# Patient Record
Sex: MALE | Race: WHITE | NOT HISPANIC OR LATINO | Employment: FULL TIME | URBAN - METROPOLITAN AREA
[De-identification: names, ages, dates, MRNs, and addresses within clinical notes are randomized per-mention and may not be internally consistent; named-entity substitution may affect disease eponyms.]

---

## 2019-09-20 RX ORDER — SCOLOPAMINE TRANSDERMAL SYSTEM 1 MG/1
1 PATCH, EXTENDED RELEASE TRANSDERMAL
COMMUNITY
Start: 2019-04-24 | End: 2020-04-23

## 2019-09-20 RX ORDER — LOSARTAN POTASSIUM 100 MG/1
TABLET ORAL
COMMUNITY
Start: 2019-09-03 | End: 2020-03-02 | Stop reason: SDUPTHER

## 2019-09-20 RX ORDER — AMLODIPINE BESYLATE 10 MG/1
TABLET ORAL
COMMUNITY
Start: 2019-09-03 | End: 2020-03-02 | Stop reason: SDUPTHER

## 2019-09-20 RX ORDER — SUCRALFATE 1 G/1
1 TABLET ORAL 4 TIMES DAILY PRN
COMMUNITY
Start: 2016-12-27

## 2019-09-20 RX ORDER — OMEPRAZOLE 20 MG/1
20 CAPSULE, DELAYED RELEASE ORAL
COMMUNITY
Start: 2019-04-24

## 2019-09-24 ENCOUNTER — OFFICE VISIT (OUTPATIENT)
Dept: FAMILY MEDICINE CLINIC | Facility: CLINIC | Age: 54
End: 2019-09-24
Payer: COMMERCIAL

## 2019-09-24 VITALS
HEIGHT: 69 IN | TEMPERATURE: 98.8 F | OXYGEN SATURATION: 97 % | WEIGHT: 216.8 LBS | HEART RATE: 80 BPM | DIASTOLIC BLOOD PRESSURE: 80 MMHG | BODY MASS INDEX: 32.11 KG/M2 | SYSTOLIC BLOOD PRESSURE: 118 MMHG | RESPIRATION RATE: 16 BRPM

## 2019-09-24 DIAGNOSIS — I10 ESSENTIAL HYPERTENSION: ICD-10-CM

## 2019-09-24 DIAGNOSIS — Z00.00 HEALTHCARE MAINTENANCE: Primary | ICD-10-CM

## 2019-09-24 DIAGNOSIS — E55.9 VITAMIN D INSUFFICIENCY: ICD-10-CM

## 2019-09-24 DIAGNOSIS — K22.70 BARRETT'S ESOPHAGUS WITHOUT DYSPLASIA: ICD-10-CM

## 2019-09-24 DIAGNOSIS — Z12.11 ENCOUNTER FOR SCREENING FOR MALIGNANT NEOPLASM OF COLON: ICD-10-CM

## 2019-09-24 DIAGNOSIS — J06.9 VIRAL URI WITH COUGH: ICD-10-CM

## 2019-09-24 DIAGNOSIS — K21.9 GASTROESOPHAGEAL REFLUX DISEASE, ESOPHAGITIS PRESENCE NOT SPECIFIED: ICD-10-CM

## 2019-09-24 PROBLEM — M47.814 THORACIC SPONDYLOSIS WITHOUT MYELOPATHY: Status: ACTIVE | Noted: 2019-09-24

## 2019-09-24 PROBLEM — M54.14 THORACIC NEURITIS: Status: ACTIVE | Noted: 2019-09-24

## 2019-09-24 PROBLEM — G47.30 SLEEP APNEA: Status: ACTIVE | Noted: 2019-04-24

## 2019-09-24 PROBLEM — Z91.030 BEE STING ALLERGY: Status: ACTIVE | Noted: 2019-04-24

## 2019-09-24 PROBLEM — K42.9 UMBILICAL HERNIA: Status: ACTIVE | Noted: 2018-05-14

## 2019-09-24 PROBLEM — M54.17 LUMBOSACRAL RADICULITIS: Status: ACTIVE | Noted: 2019-09-24

## 2019-09-24 PROBLEM — Z01.818 PREOP EXAMINATION: Status: ACTIVE | Noted: 2018-05-14

## 2019-09-24 PROCEDURE — 99386 PREV VISIT NEW AGE 40-64: CPT | Performed by: FAMILY MEDICINE

## 2019-09-24 RX ORDER — EPINEPHRINE 0.3 MG/.3ML
INJECTION SUBCUTANEOUS
COMMUNITY
End: 2021-05-07 | Stop reason: SDUPTHER

## 2019-09-24 RX ORDER — ERGOCALCIFEROL 1.25 MG/1
50000 CAPSULE ORAL WEEKLY
Qty: 4 CAPSULE | Refills: 3 | Status: SHIPPED | OUTPATIENT
Start: 2019-09-24 | End: 2019-12-20 | Stop reason: SDUPTHER

## 2019-09-24 NOTE — ASSESSMENT & PLAN NOTE
Continue on the Prilosec  I am going to refer him to see she I for follow-up and possible endoscopy

## 2019-09-24 NOTE — PROGRESS NOTES
Assessment/Plan:  Encounter for screening for malignant neoplasm of colon  It was discussed about immunization, diet, exercise and safety measures    Essential hypertension  Well controlled  Continue same  Will continue to monitor  Bashir's esophagus  Continue on the Prilosec  I am going to refer him to see she I for follow-up and possible endoscopy  Gastroesophageal reflux disease  Stable  Continue same  Viral URI with cough  It was discussed about encourage oral hydration and use humidifier at home  Diagnoses and all orders for this visit:    Healthcare maintenance    Viral URI with cough    Essential hypertension    Gastroesophageal reflux disease, esophagitis presence not specified    Encounter for screening for malignant neoplasm of colon  -     Occult Blood, Fecal Immunochemical; Future  -     Ambulatory referral to Gastroenterology; Future    Bashir's esophagus without dysplasia  -     Ambulatory referral to Gastroenterology; Future    Vitamin D insufficiency  -     ergocalciferol (VITAMIN D2) 50,000 units; Take 1 capsule (50,000 Units total) by mouth once a week    BMI 32 0-32 9,adult    Other orders  -     amLODIPine (NORVASC) 10 mg tablet; 1 tab  Daily in the AM  -     losartan (COZAAR) 100 MG tablet; 1 tab  Daily in the AM  -     omeprazole (PRILOSEC) 20 mg delayed release capsule; Take 20 mg by mouth  -     scopolamine (TRANSDERM-SCOP) 1 5 mg/3 days TD 72 hr patch; Place 1 patch on the skin every third day  -     sucralfate (CARAFATE) 1 g tablet; Take 1 g by mouth 4 (four) times a day  -     EPINEPHrine (EPIPEN 2-KENYA) 0 3 mg/0 3 mL SOAJ; EpiPen 2-[place] 0 3 mg/0 3 mL injection, auto-injector          Patient Instructions     Low Fat Diet   AMBULATORY CARE:   A low-fat diet  is an eating plan that is low in total fat, unhealthy fat, and cholesterol  You may need to follow a low-fat diet if you have trouble digesting or absorbing fat   You may also need to follow this diet if you have high cholesterol  You can also lower your cholesterol by increasing the amount of fiber in your diet  Soluble fiber is a type of fiber that helps to decrease cholesterol levels  Different types of fat in food:   · Limit unhealthy fats  A diet that is high in cholesterol, saturated fat, and trans fat may cause unhealthy cholesterol levels  Unhealthy cholesterol levels increase your risk of heart disease  ¨ Cholesterol:  Limit intake of cholesterol to less than 200 mg per day  Cholesterol is found in meat, eggs, and dairy  ¨ Saturated fat:  Limit saturated fat to less than 7% of your total daily calories  Ask your dietitian how many calories you need each day  Saturated fat is found in butter, cheese, ice cream, whole milk, and palm oil  Saturated fat is also found in meat, such as beef, pork, chicken skin, and processed meats  Processed meats include sausage, hot dogs, and bologna  ¨ Trans fat:  Avoid trans fat as much as possible  Trans fat is used in fried and baked foods  Foods that say trans fat free on the label may still have up to 0 5 grams of trans fat per serving  · Include healthy fats  Replace foods that are high in saturated and trans fat with foods high in healthy fats  This may help to decrease high cholesterol levels  ¨ Monounsaturated fats: These are found in avocados, nuts, and vegetable oils, such as olive, canola, and sunflower oil  ¨ Polyunsaturated fats: These can be found in vegetable oils, such as soybean or corn oil  Omega-3 fats can help to decrease the risk of heart disease  Omega-3 fats are found in fish, such as salmon, herring, trout, and tuna  Omega-3 fats can also be found in plant foods, such as walnuts, flaxseed, soybeans, and canola oil    Foods to limit or avoid:   · Grains:      ¨ Snacks that are made with partially hydrogenated oils, such as chips, regular crackers, and butter-flavored popcorn    ¨ High-fat baked goods, such as biscuits, croissants, doughnuts, pies, cookies, and pastries    · Dairy:      ¨ Whole milk, 2% milk, and yogurt and ice cream made with whole milk    ¨ Half and half creamer, heavy cream, and whipping cream    ¨ Cheese, cream cheese, and sour cream    · Meats and proteins:      ¨ High-fat cuts of meat (T-bone steak, regular hamburger, and ribs)    ¨ Fried meat, poultry (turkey and chicken), and fish    ¨ Poultry (chicken and turkey) with skin    ¨ Cold cuts (salami or bologna), hot dogs, manley, and sausage    ¨ Whole eggs and egg yolks    · Vegetables and fruits with added fat:      ¨ Fried vegetables or vegetables in butter or high-fat sauces, such as cream or cheese sauces    ¨ Fried fruit or fruit served with butter or cream    · Fats:      ¨ Butter, stick margarine, and shortening    ¨ Coconut, palm oil, and palm kernel oil  Foods to include:   · Grains:      ¨ Whole-grain breads, cereals, pasta, and brown rice    ¨ Low-fat crackers and pretzels    · Vegetables and fruits:      ¨ Fresh, frozen, or canned vegetables (no salt or low-sodium)    ¨ Fresh, frozen, dried, or canned fruit (canned in light syrup or fruit juice)    ¨ Avocado    · Low-fat dairy products:      ¨ Nonfat (skim) or 1% milk    ¨ Nonfat or low-fat cheese, yogurt, and cottage cheese    · Meats and proteins:      ¨ Chicken or turkey with no skin    ¨ Baked or broiled fish    ¨ Lean beef and pork (loin, round, extra lean hamburger)    ¨ Beans and peas, unsalted nuts, soy products    ¨ Egg whites and substitutes    ¨ Seeds and nuts    · Fats:      ¨ Unsaturated oil, such as canola, olive, peanut, soybean, or sunflower oil    ¨ Soft or liquid margarine and vegetable oil spread    ¨ Low-fat salad dressing  Other ways to decrease fat:   · Read food labels before you buy foods  Choose foods that have less than 30% of calories from fat  Choose low-fat or fat-free dairy products  Remember that fat free does not mean calorie free   These foods still contain calories, and too many calories can lead to weight gain  · Trim fat from meat and avoid fried food  Trim all visible fat from meat before you cook it  Remove the skin from poultry  Do not rebollar meat, fish, or poultry  Bake, roast, boil, or broil these foods instead  Avoid fried foods  Eat a baked potato instead of Western [place] fries  Steam vegetables instead of sautéing them in butter  · Add less fat to foods  Use imitation manley bits on salads and baked potatoes instead of regular manley bits  Use fat-free or low-fat salad dressings instead of regular dressings  Use low-fat or nonfat butter-flavored topping instead of regular butter or margarine on popcorn and other foods  Ways to decrease fat in recipes:  Replace high-fat ingredients with low-fat or nonfat ones  This may cause baked goods to be drier than usual  You may need to use nonfat cooking spray on pans to prevent food from sticking  You also may need to change the amount of other ingredients, such as water, in the recipe  Try the following:  · Use low-fat or light margarine instead of regular margarine or shortening  · Use lean ground turkey breast or chicken, or lean ground beef (less than 5% fat) instead of hamburger  · Add 1 teaspoon of canola oil to 8 ounces of skim milk instead of using cream or half and half  · Use grated zucchini, carrots, or apples in breads instead of coconut  · Use blenderized, low-fat cottage cheese, plain tofu, or low-fat ricotta cheese instead of cream cheese  · Use 1 egg white and 1 teaspoon of canola oil, or use ¼ cup (2 ounces) of fat-free egg substitute instead of a whole egg  · Replace half of the oil that is called for in a recipe with applesauce when you bake  Use 3 tablespoons of cocoa powder and 1 tablespoon of canola oil instead of a square of baking chocolate  How to increase fiber:  Eat enough high-fiber foods to get 20 to 30 grams of fiber every day   Slowly increase your fiber intake to avoid stomach cramps, gas, and other problems  · Eat 3 ounces of whole-grain foods each day  An ounce is about 1 slice of bread  Eat whole-grain breads, such as whole-wheat bread  Whole wheat, whole-wheat flour, or other whole grains should be listed as the first ingredient on the food label  Replace white flour with whole-grain flour or use half of each in recipes  Whole-grain flour is heavier than white flour, so you may have to add more yeast or baking powder  · Eat a high-fiber cereal for breakfast   Oatmeal is a good source of soluble fiber  Look for cereals that have bran or fiber in the name  Choose whole-grain products, such as brown rice, barley, and whole-wheat pasta  · Eat more beans, peas, and lentils  For example, add beans to soups or salads  Eat at least 5 cups of fruits and vegetables each day  Eat fruits and vegetables with the peel because the peel is high in fiber  © 2017 [facility] [facility] Information is for End User's use only and may not be sold, redistributed or otherwise used for commercial purposes  All illustrations and images included in CareNotes® are the copyrighted property of A D A M , Inc  or [employer]  The above information is an  only  It is not intended as medical advice for individual conditions or treatments  Talk to your doctor, nurse or pharmacist before following any medical regimen to see if it is safe and effective for you  Return in about 6 months (around 3/24/2020)  Subjective:      Patient ID: Jarad Lemus is a 48 y o  male  Chief Complaint   Patient presents with    Hypertension    Cold Like Symptoms     sore throat,cough, and sinus        Orin Anju is a 48 y o  M with a PMH of HTN and GERD with Bashir's esophagus presenting to the office for a well check up and for sore throat x4 days, congestion, cough, rhinorrhea, headache, and dry eyes x2-3 days  He has had sick contacts at work at the [employer] recently    He has tried Mucinex and Emergen-C which provided no relief  He denies any fevers or chills  He otherwise has no complaints today  He is taking all of his medications as prescribed and experiencing no side effects  The following portions of the patient's history were reviewed and updated as appropriate: allergies, current medications, past family history, past medical history, past social history, past surgical history and problem list     Review of Systems   Constitutional: Positive for fatigue (can't sleep at night due to congestion)  Negative for chills and fever  HENT: Positive for congestion, rhinorrhea, sinus pressure and sore throat  Negative for ear discharge, ear pain, postnasal drip, tinnitus and trouble swallowing  Eyes: Positive for pain (dry, irritated sensation)  Negative for visual disturbance  Respiratory: Positive for cough  Negative for shortness of breath  Cardiovascular: Negative for chest pain and palpitations  Gastrointestinal: Negative for abdominal pain, blood in stool, nausea and vomiting  Endocrine: Negative for cold intolerance and heat intolerance  Genitourinary: Negative for difficulty urinating and dysuria  Musculoskeletal: Negative for gait problem  Skin: Negative for rash  Allergic/Immunologic: Negative for environmental allergies and food allergies  Neurological: Positive for headaches  Negative for dizziness, syncope and light-headedness  Hematological: Negative for adenopathy  Psychiatric/Behavioral: Negative for behavioral problems  The patient is not nervous/anxious            Current Outpatient Medications   Medication Sig Dispense Refill    amLODIPine (NORVASC) 10 mg tablet 1 tab  Daily in the AM      EPINEPHrine (EPIPEN 2-KENYA) 0 3 mg/0 3 mL SOAJ EpiPen 2-[place] 0 3 mg/0 3 mL injection, auto-injector      losartan (COZAAR) 100 MG tablet 1 tab  Daily in the AM      omeprazole (PRILOSEC) 20 mg delayed release capsule Take 20 mg by mouth      scopolamine (TRANSDERM-SCOP) 1 5 mg/3 days TD 72 hr patch Place 1 patch on the skin every third day      sucralfate (CARAFATE) 1 g tablet Take 1 g by mouth 4 (four) times a day      ergocalciferol (VITAMIN D2) 50,000 units Take 1 capsule (50,000 Units total) by mouth once a week 4 capsule 3     No current facility-administered medications for this visit  Objective:    /80 (BP Location: Left arm, Patient Position: Sitting, Cuff Size: Large)   Pulse 80   Temp 98 8 °F (37 1 °C) (Tympanic)   Resp 16   Ht 5' 9" (1 753 m)   Wt 98 3 kg (216 lb 12 8 oz)   SpO2 97%   BMI 32 02 kg/m²        Physical Exam   Constitutional: He is oriented to person, place, and time  He appears well-developed and well-nourished  HENT:   Head: Normocephalic and atraumatic  Right Ear: Hearing and external ear normal  A middle ear effusion is present  Left Ear: Hearing and external ear normal  A middle ear effusion is present  Mouth/Throat: Posterior oropharyngeal erythema present  No oropharyngeal exudate  Eyes: Pupils are equal, round, and reactive to light  EOM are normal    Neck: Normal range of motion  Neck supple  Cardiovascular: Normal rate, regular rhythm and normal heart sounds  Exam reveals no gallop and no friction rub  No murmur heard  Pulmonary/Chest: Effort normal and breath sounds normal  No stridor  He has no wheezes  He has no rales  Abdominal: Soft  Bowel sounds are normal  He exhibits no distension  There is no tenderness  Musculoskeletal: Normal range of motion  He exhibits no edema  Lymphadenopathy:     He has no cervical adenopathy  Neurological: He is alert and oriented to person, place, and time  No cranial nerve deficit  Skin: Skin is warm and dry  Capillary refill takes less than 2 seconds  No rash noted  Psychiatric: He has a normal mood and affect  His behavior is normal    Nursing note and vitals reviewed  Patsy Santos MD BMI Counseling:  Body mass index is 32 02 kg/m²  The BMI is above normal  Nutrition recommendations include reducing portion sizes, decreasing overall calorie intake and 3-5 servings of fruits/vegetables daily  Exercise recommendations include moderate aerobic physical activity for 150 minutes/week

## 2019-09-24 NOTE — PATIENT INSTRUCTIONS
Low Fat Diet   AMBULATORY CARE:   A low-fat diet  is an eating plan that is low in total fat, unhealthy fat, and cholesterol  You may need to follow a low-fat diet if you have trouble digesting or absorbing fat  You may also need to follow this diet if you have high cholesterol  You can also lower your cholesterol by increasing the amount of fiber in your diet  Soluble fiber is a type of fiber that helps to decrease cholesterol levels  Different types of fat in food:   · Limit unhealthy fats  A diet that is high in cholesterol, saturated fat, and trans fat may cause unhealthy cholesterol levels  Unhealthy cholesterol levels increase your risk of heart disease  ¨ Cholesterol:  Limit intake of cholesterol to less than 200 mg per day  Cholesterol is found in meat, eggs, and dairy  ¨ Saturated fat:  Limit saturated fat to less than 7% of your total daily calories  Ask your dietitian how many calories you need each day  Saturated fat is found in butter, cheese, ice cream, whole milk, and palm oil  Saturated fat is also found in meat, such as beef, pork, chicken skin, and processed meats  Processed meats include sausage, hot dogs, and bologna  ¨ Trans fat:  Avoid trans fat as much as possible  Trans fat is used in fried and baked foods  Foods that say trans fat free on the label may still have up to 0 5 grams of trans fat per serving  · Include healthy fats  Replace foods that are high in saturated and trans fat with foods high in healthy fats  This may help to decrease high cholesterol levels  ¨ Monounsaturated fats: These are found in avocados, nuts, and vegetable oils, such as olive, canola, and sunflower oil  ¨ Polyunsaturated fats: These can be found in vegetable oils, such as soybean or corn oil  Omega-3 fats can help to decrease the risk of heart disease  Omega-3 fats are found in fish, such as salmon, herring, trout, and tuna   Omega-3 fats can also be found in plant foods, such as walnuts, flaxseed, soybeans, and canola oil    Foods to limit or avoid:   · Grains:      ¨ Snacks that are made with partially hydrogenated oils, such as chips, regular crackers, and butter-flavored popcorn    ¨ High-fat baked goods, such as biscuits, croissants, doughnuts, pies, cookies, and pastries    · Dairy:      ¨ Whole milk, 2% milk, and yogurt and ice cream made with whole milk    ¨ Half and half creamer, heavy cream, and whipping cream    ¨ Cheese, cream cheese, and sour cream    · Meats and proteins:      ¨ High-fat cuts of meat (T-bone steak, regular hamburger, and ribs)    ¨ Fried meat, poultry (turkey and chicken), and fish    ¨ Poultry (chicken and turkey) with skin    ¨ Cold cuts (salami or bologna), hot dogs, manley, and sausage    ¨ Whole eggs and egg yolks    · Vegetables and fruits with added fat:      ¨ Fried vegetables or vegetables in butter or high-fat sauces, such as cream or cheese sauces    ¨ Fried fruit or fruit served with butter or cream    · Fats:      ¨ Butter, stick margarine, and shortening    ¨ Coconut, palm oil, and palm kernel oil  Foods to include:   · Grains:      ¨ Whole-grain breads, cereals, pasta, and brown rice    ¨ Low-fat crackers and pretzels    · Vegetables and fruits:      ¨ Fresh, frozen, or canned vegetables (no salt or low-sodium)    ¨ Fresh, frozen, dried, or canned fruit (canned in light syrup or fruit juice)    ¨ Avocado    · Low-fat dairy products:      ¨ Nonfat (skim) or 1% milk    ¨ Nonfat or low-fat cheese, yogurt, and cottage cheese    · Meats and proteins:      ¨ Chicken or turkey with no skin    ¨ Baked or broiled fish    ¨ Lean beef and pork (loin, round, extra lean hamburger)    ¨ Beans and peas, unsalted nuts, soy products    ¨ Egg whites and substitutes    ¨ Seeds and nuts    · Fats:      ¨ Unsaturated oil, such as canola, olive, peanut, soybean, or sunflower oil    ¨ Soft or liquid margarine and vegetable oil spread    ¨ Low-fat salad dressing  Other ways to decrease fat:   · Read food labels before you buy foods  Choose foods that have less than 30% of calories from fat  Choose low-fat or fat-free dairy products  Remember that fat free does not mean calorie free  These foods still contain calories, and too many calories can lead to weight gain  · Trim fat from meat and avoid fried food  Trim all visible fat from meat before you cook it  Remove the skin from poultry  Do not rebollar meat, fish, or poultry  Bake, roast, boil, or broil these foods instead  Avoid fried foods  Eat a baked potato instead of [place] fries  Steam vegetables instead of sautéing them in butter  · Add less fat to foods  Use imitation manley bits on salads and baked potatoes instead of regular manley bits  Use fat-free or low-fat salad dressings instead of regular dressings  Use low-fat or nonfat butter-flavored topping instead of regular butter or margarine on popcorn and other foods  Ways to decrease fat in recipes:  Replace high-fat ingredients with low-fat or nonfat ones  This may cause baked goods to be drier than usual  You may need to use nonfat cooking spray on pans to prevent food from sticking  You also may need to change the amount of other ingredients, such as water, in the recipe  Try the following:  · Use low-fat or light margarine instead of regular margarine or shortening  · Use lean ground turkey breast or chicken, or lean ground beef (less than 5% fat) instead of hamburger  · Add 1 teaspoon of canola oil to 8 ounces of skim milk instead of using cream or half and half  · Use grated zucchini, carrots, or apples in breads instead of coconut  · Use blenderized, low-fat cottage cheese, plain tofu, or low-fat ricotta cheese instead of cream cheese  · Use 1 egg white and 1 teaspoon of canola oil, or use ¼ cup (2 ounces) of fat-free egg substitute instead of a whole egg       · Replace half of the oil that is called for in a recipe with applesauce when you bake  Use 3 tablespoons of cocoa powder and 1 tablespoon of canola oil instead of a square of baking chocolate  How to increase fiber:  Eat enough high-fiber foods to get 20 to 30 grams of fiber every day  Slowly increase your fiber intake to avoid stomach cramps, gas, and other problems  · Eat 3 ounces of whole-grain foods each day  An ounce is about 1 slice of bread  Eat whole-grain breads, such as whole-wheat bread  Whole wheat, whole-wheat flour, or other whole grains should be listed as the first ingredient on the food label  Replace white flour with whole-grain flour or use half of each in recipes  Whole-grain flour is heavier than white flour, so you may have to add more yeast or baking powder  · Eat a high-fiber cereal for breakfast   Oatmeal is a good source of soluble fiber  Look for cereals that have bran or fiber in the name  Choose whole-grain products, such as brown rice, barley, and whole-wheat pasta  · Eat more beans, peas, and lentils  For example, add beans to soups or salads  Eat at least 5 cups of fruits and vegetables each day  Eat fruits and vegetables with the peel because the peel is high in fiber  © 2017 [address] [employer] Information is for End User's use only and may not be sold, redistributed or otherwise used for commercial purposes  All illustrations and images included in CareNotes® are the copyrighted property of A D A M , Inc  or [employer]  The above information is an  only  It is not intended as medical advice for individual conditions or treatments  Talk to your doctor, nurse or pharmacist before following any medical regimen to see if it is safe and effective for you

## 2019-10-23 ENCOUNTER — TRANSCRIBE ORDERS (OUTPATIENT)
Dept: URGENT CARE | Facility: CLINIC | Age: 54
End: 2019-10-23

## 2019-10-23 ENCOUNTER — OFFICE VISIT (OUTPATIENT)
Dept: URGENT CARE | Facility: CLINIC | Age: 54
End: 2019-10-23

## 2019-10-23 VITALS
WEIGHT: 214.6 LBS | BODY MASS INDEX: 31.78 KG/M2 | HEIGHT: 69 IN | DIASTOLIC BLOOD PRESSURE: 89 MMHG | SYSTOLIC BLOOD PRESSURE: 135 MMHG

## 2019-10-23 DIAGNOSIS — Z13.6 SCREENING FOR CARDIOVASCULAR CONDITION: Primary | ICD-10-CM

## 2019-10-23 DIAGNOSIS — Z00.8 ENCOUNTER FOR BIOMETRIC SCREENING: Primary | ICD-10-CM

## 2019-10-23 DIAGNOSIS — Z00.8 ENCOUNTER FOR BIOMETRIC SCREENING: ICD-10-CM

## 2019-10-23 PROCEDURE — G0480 DRUG TEST DEF 1-7 CLASSES: HCPCS | Performed by: NURSE PRACTITIONER

## 2019-10-23 PROCEDURE — 80323 ALKALOIDS NOS: CPT | Performed by: NURSE PRACTITIONER

## 2019-10-23 NOTE — PROGRESS NOTES
Biometric screening only     /89   Ht 5' 9" (1 753 m)   Wt 97 3 kg (214 lb 9 6 oz)   BMI 31 69 kg/m²   Waist: 40

## 2019-11-01 LAB
COTININE SERPL-MCNC: NORMAL NG/ML
NICOTINE SERPL-MCNC: NORMAL NG/ML

## 2019-12-20 DIAGNOSIS — E55.9 VITAMIN D INSUFFICIENCY: ICD-10-CM

## 2019-12-20 RX ORDER — ERGOCALCIFEROL 1.25 MG/1
CAPSULE ORAL
Qty: 12 CAPSULE | Refills: 1 | Status: SHIPPED | OUTPATIENT
Start: 2019-12-20

## 2020-01-18 ENCOUNTER — TELEPHONE (OUTPATIENT)
Dept: GASTROENTEROLOGY | Facility: MEDICAL CENTER | Age: 55
End: 2020-01-18

## 2020-02-18 ENCOUNTER — TELEPHONE (OUTPATIENT)
Dept: FAMILY MEDICINE CLINIC | Facility: CLINIC | Age: 55
End: 2020-02-18

## 2020-02-18 NOTE — TELEPHONE ENCOUNTER
L/m for pt to call office  Referral was placed for colon and occult order was placed  Did pt have either test done?

## 2020-03-02 DIAGNOSIS — I10 ESSENTIAL HYPERTENSION: Primary | ICD-10-CM

## 2020-03-02 RX ORDER — AMLODIPINE BESYLATE 10 MG/1
10 TABLET ORAL DAILY
Qty: 30 TABLET | Refills: 5 | Status: SHIPPED | OUTPATIENT
Start: 2020-03-02 | End: 2020-10-06 | Stop reason: SDUPTHER

## 2020-03-02 RX ORDER — LOSARTAN POTASSIUM 100 MG/1
100 TABLET ORAL DAILY
Qty: 30 TABLET | Refills: 5 | Status: SHIPPED | OUTPATIENT
Start: 2020-03-02 | End: 2020-10-06 | Stop reason: SDUPTHER

## 2020-03-25 ENCOUNTER — TELEMEDICINE (OUTPATIENT)
Dept: FAMILY MEDICINE CLINIC | Facility: CLINIC | Age: 55
End: 2020-03-25
Payer: COMMERCIAL

## 2020-03-25 VITALS
DIASTOLIC BLOOD PRESSURE: 80 MMHG | HEIGHT: 69 IN | BODY MASS INDEX: 32.14 KG/M2 | WEIGHT: 217 LBS | SYSTOLIC BLOOD PRESSURE: 120 MMHG

## 2020-03-25 DIAGNOSIS — J06.9 VIRAL URI: Primary | ICD-10-CM

## 2020-03-25 PROCEDURE — 3074F SYST BP LT 130 MM HG: CPT | Performed by: FAMILY MEDICINE

## 2020-03-25 PROCEDURE — 3079F DIAST BP 80-89 MM HG: CPT | Performed by: FAMILY MEDICINE

## 2020-03-25 PROCEDURE — 3008F BODY MASS INDEX DOCD: CPT | Performed by: FAMILY MEDICINE

## 2020-03-25 PROCEDURE — 99213 OFFICE O/P EST LOW 20 MIN: CPT | Performed by: FAMILY MEDICINE

## 2020-03-25 NOTE — PROGRESS NOTES
Virtual Regular Visit    Problem List Items Addressed This Visit        Respiratory    Viral URI - Primary     Encourage oral hydration  Use Flonase nasal spray  Take Tylenol for fever  If symptoms worse call back  If any shortness of breath go to the hospital   In the meanwhile no going back to work until Monday if symptoms resolved  Was told to try to isolate himself at home  Patient understood and agreed  Wife also was on the phone and she understood and agreed with the management  Reason for visit is upper respiratory symptoms    Encounter provider Micheal Gomez MD    Provider located at [address] [place] [address] [address] [id number]      Recent Visits  No visits were found meeting these conditions  Showing recent visits within past 7 days and meeting all other requirements     Future Appointments  No visits were found meeting these conditions  Showing future appointments within next 150 days and meeting all other requirements        After connecting through [employer], the patient was identified by name and date of birth  Nina Hua was informed that this is a telemedicine visit and that the visit is being conducted through telephone which may not be secure and therefore, might not be HIPAA-compliant  My office door was closed  No one else was in the room  He acknowledged consent and understanding of privacy and security of the video platform  The patient has agreed to participate and understands they can discontinue the visit at any time  Subjective  Nina Hua is a 48 y o  male with complaint of upper respiratory symptoms including nasal congestion, postnasal drip and slight cough  He stated he had low-grade fever 100 3 yesterday  He stated since then he has not had any high fever his temperature is been around 92    He denies any recent travel or contact with people with high risk of exposure to coronavirus  Past Medical History:   Diagnosis Date    GERD (gastroesophageal reflux disease)     Hypertension        Past Surgical History:   Procedure Laterality Date    SHOULDER SURGERY Left        Current Outpatient Medications   Medication Sig Dispense Refill    amLODIPine (NORVASC) 10 mg tablet Take 1 tablet (10 mg total) by mouth daily 30 tablet 5    EPINEPHrine (EPIPEN 2-KENYA) 0 3 mg/0 3 mL SOAJ EpiPen 2-Kenya 0 3 mg/0 3 mL injection, auto-injector      ergocalciferol (VITAMIN D2) 50,000 units TAKE ONE CAPSULE BY MOUTH ONE TIME PER WEEK 12 capsule 1    losartan (COZAAR) 100 MG tablet Take 1 tablet (100 mg total) by mouth daily 30 tablet 5    omeprazole (PRILOSEC) 20 mg delayed release capsule Take 20 mg by mouth      scopolamine (TRANSDERM-SCOP) 1 5 mg/3 days TD 72 hr patch Place 1 patch on the skin every third day      sucralfate (CARAFATE) 1 g tablet Take 1 g by mouth 4 (four) times a day       No current facility-administered medications for this visit  Allergies   Allergen Reactions    Bee Venom Anaphylaxis    Latex Rash    Other Rash       Review of Systems   Constitutional: Negative for activity change, chills and fever  HENT: Positive for congestion, postnasal drip, rhinorrhea and sinus pressure  Negative for trouble swallowing  Eyes: Negative for visual disturbance  Respiratory: Positive for cough  Negative for apnea and shortness of breath  Cardiovascular: Negative for chest pain and palpitations  Gastrointestinal: Negative for abdominal pain, blood in stool, diarrhea and vomiting  Endocrine: Negative for cold intolerance and heat intolerance  Genitourinary: Negative for difficulty urinating and dysuria  Musculoskeletal: Negative for gait problem  Skin: Negative for rash  Neurological: Negative for dizziness, syncope and headaches  Hematological: Negative for adenopathy     Psychiatric/Behavioral: Negative for behavioral problems  I spent 20 minutes with the patient during this visit  BMI Counseling: Body mass index is 32 05 kg/m²  The BMI is above normal  Nutrition recommendations include reducing portion sizes, decreasing overall calorie intake and 3-5 servings of fruits/vegetables daily  Exercise recommendations include moderate aerobic physical activity for 150 minutes/week

## 2020-03-25 NOTE — LETTER
March 25, 2020     Patient: Ijeoma Crespo   YOB: 1965   Date of Visit: 3/25/2020       To Whom it May Concern:    Ijeoma Crespo is under my professional care  He was evaluated by me by virtual visit on 3/25/2020  He may return to work on 03/30/2020  If you have any questions or concerns, please don't hesitate to call           Sincerely,          Merrily Jeans, MD        CC: No Recipients

## 2020-03-25 NOTE — ASSESSMENT & PLAN NOTE
Encourage oral hydration  Use Flonase nasal spray  Take Tylenol for fever  If symptoms worse call back  If any shortness of breath go to the hospital   In the meanwhile no going back to work until Monday if symptoms resolved  Was told to try to isolate himself at home  Patient understood and agreed  Wife also was on the phone and she understood and agreed with the management

## 2020-04-01 ENCOUNTER — TELEPHONE (OUTPATIENT)
Dept: FAMILY MEDICINE CLINIC | Facility: CLINIC | Age: 55
End: 2020-04-01

## 2020-04-02 DIAGNOSIS — Z20.828 EXPOSURE TO SARS-ASSOCIATED CORONAVIRUS: Primary | ICD-10-CM

## 2020-04-02 DIAGNOSIS — Z20.828 EXPOSURE TO SARS-ASSOCIATED CORONAVIRUS: ICD-10-CM

## 2020-04-02 PROCEDURE — 87635 SARS-COV-2 COVID-19 AMP PRB: CPT

## 2020-04-05 ENCOUNTER — TELEMEDICINE (OUTPATIENT)
Dept: FAMILY MEDICINE CLINIC | Facility: CLINIC | Age: 55
End: 2020-04-05
Payer: COMMERCIAL

## 2020-04-05 VITALS — TEMPERATURE: 98.1 F

## 2020-04-05 DIAGNOSIS — U07.1 ACUTE RESPIRATORY DISEASE DUE TO COVID-19 VIRUS: Primary | ICD-10-CM

## 2020-04-05 DIAGNOSIS — J06.9 ACUTE RESPIRATORY DISEASE DUE TO COVID-19 VIRUS: Primary | ICD-10-CM

## 2020-04-05 LAB — SARS-COV-2 RNA SPEC QL NAA+PROBE: DETECTED

## 2020-04-05 PROCEDURE — 99213 OFFICE O/P EST LOW 20 MIN: CPT | Performed by: FAMILY MEDICINE

## 2020-04-06 ENCOUNTER — TELEMEDICINE (OUTPATIENT)
Dept: FAMILY MEDICINE CLINIC | Facility: CLINIC | Age: 55
End: 2020-04-06
Payer: COMMERCIAL

## 2020-04-06 VITALS — TEMPERATURE: 98.4 F

## 2020-04-06 DIAGNOSIS — U07.1 ACUTE RESPIRATORY DISEASE DUE TO COVID-19 VIRUS: Primary | ICD-10-CM

## 2020-04-06 DIAGNOSIS — J06.9 ACUTE RESPIRATORY DISEASE DUE TO COVID-19 VIRUS: Primary | ICD-10-CM

## 2020-04-06 PROCEDURE — 99421 OL DIG E/M SVC 5-10 MIN: CPT | Performed by: FAMILY MEDICINE

## 2020-04-08 ENCOUNTER — TELEMEDICINE (OUTPATIENT)
Dept: FAMILY MEDICINE CLINIC | Facility: CLINIC | Age: 55
End: 2020-04-08
Payer: COMMERCIAL

## 2020-04-08 DIAGNOSIS — J06.9 ACUTE RESPIRATORY DISEASE DUE TO COVID-19 VIRUS: Primary | ICD-10-CM

## 2020-04-08 DIAGNOSIS — U07.1 ACUTE RESPIRATORY DISEASE DUE TO COVID-19 VIRUS: Primary | ICD-10-CM

## 2020-04-08 PROCEDURE — 99213 OFFICE O/P EST LOW 20 MIN: CPT | Performed by: FAMILY MEDICINE

## 2020-04-10 ENCOUNTER — TELEMEDICINE (OUTPATIENT)
Dept: FAMILY MEDICINE CLINIC | Facility: CLINIC | Age: 55
End: 2020-04-10
Payer: COMMERCIAL

## 2020-04-10 VITALS — TEMPERATURE: 98.1 F

## 2020-04-10 DIAGNOSIS — U07.1 ACUTE RESPIRATORY DISEASE DUE TO COVID-19 VIRUS: Primary | ICD-10-CM

## 2020-04-10 DIAGNOSIS — J06.9 ACUTE RESPIRATORY DISEASE DUE TO COVID-19 VIRUS: Primary | ICD-10-CM

## 2020-04-10 PROCEDURE — 99214 OFFICE O/P EST MOD 30 MIN: CPT | Performed by: FAMILY MEDICINE

## 2020-05-13 ENCOUNTER — PATIENT OUTREACH (OUTPATIENT)
Dept: CASE MANAGEMENT | Facility: HOSPITAL | Age: 55
End: 2020-05-13

## 2020-05-20 ENCOUNTER — TELEPHONE (OUTPATIENT)
Dept: FAMILY MEDICINE CLINIC | Facility: CLINIC | Age: 55
End: 2020-05-20

## 2020-08-12 ENCOUNTER — TELEPHONE (OUTPATIENT)
Dept: FAMILY MEDICINE CLINIC | Facility: CLINIC | Age: 55
End: 2020-08-12

## 2020-08-12 NOTE — TELEPHONE ENCOUNTER
----- Message from Jillian Dempsey sent at 8/10/2020  3:41 PM EDT -----  Regarding: Schedule appt  Please call pt and schedule 6 month follow up per Dr Lyndsey Hernandez  PE is due after 9/24/20

## 2020-10-06 ENCOUNTER — OFFICE VISIT (OUTPATIENT)
Dept: FAMILY MEDICINE CLINIC | Facility: CLINIC | Age: 55
End: 2020-10-06
Payer: COMMERCIAL

## 2020-10-06 VITALS
HEART RATE: 80 BPM | DIASTOLIC BLOOD PRESSURE: 80 MMHG | SYSTOLIC BLOOD PRESSURE: 124 MMHG | BODY MASS INDEX: 33.44 KG/M2 | RESPIRATION RATE: 16 BRPM | HEIGHT: 69 IN | OXYGEN SATURATION: 98 % | WEIGHT: 225.8 LBS | TEMPERATURE: 98.5 F

## 2020-10-06 DIAGNOSIS — Z77.011 LEAD EXPOSURE: ICD-10-CM

## 2020-10-06 DIAGNOSIS — Z00.00 HEALTHCARE MAINTENANCE: Primary | ICD-10-CM

## 2020-10-06 DIAGNOSIS — Z12.11 ENCOUNTER FOR COLORECTAL CANCER SCREENING: ICD-10-CM

## 2020-10-06 DIAGNOSIS — E78.49 OTHER HYPERLIPIDEMIA: ICD-10-CM

## 2020-10-06 DIAGNOSIS — I10 ESSENTIAL HYPERTENSION: ICD-10-CM

## 2020-10-06 DIAGNOSIS — K22.70 BARRETT'S ESOPHAGUS WITHOUT DYSPLASIA: ICD-10-CM

## 2020-10-06 DIAGNOSIS — Z12.12 ENCOUNTER FOR COLORECTAL CANCER SCREENING: ICD-10-CM

## 2020-10-06 DIAGNOSIS — Z12.5 PROSTATE CANCER SCREENING: ICD-10-CM

## 2020-10-06 PROCEDURE — 99396 PREV VISIT EST AGE 40-64: CPT | Performed by: FAMILY MEDICINE

## 2020-10-06 PROCEDURE — 3725F SCREEN DEPRESSION PERFORMED: CPT | Performed by: FAMILY MEDICINE

## 2020-10-06 PROCEDURE — 3079F DIAST BP 80-89 MM HG: CPT | Performed by: FAMILY MEDICINE

## 2020-10-06 PROCEDURE — 1036F TOBACCO NON-USER: CPT | Performed by: FAMILY MEDICINE

## 2020-10-06 RX ORDER — AMLODIPINE BESYLATE 10 MG/1
10 TABLET ORAL DAILY
Qty: 30 TABLET | Refills: 5 | Status: SHIPPED | OUTPATIENT
Start: 2020-10-06 | End: 2021-04-26

## 2020-10-06 RX ORDER — LOSARTAN POTASSIUM 100 MG/1
100 TABLET ORAL DAILY
Qty: 30 TABLET | Refills: 5 | Status: SHIPPED | OUTPATIENT
Start: 2020-10-06 | End: 2021-05-07 | Stop reason: SDUPTHER

## 2020-10-27 ENCOUNTER — PREP FOR PROCEDURE (OUTPATIENT)
Dept: GASTROENTEROLOGY | Facility: CLINIC | Age: 55
End: 2020-10-27

## 2020-10-27 DIAGNOSIS — Z12.11 SCREENING FOR COLON CANCER: Primary | ICD-10-CM

## 2020-11-06 ENCOUNTER — LAB (OUTPATIENT)
Dept: LAB | Age: 55
End: 2020-11-06
Payer: COMMERCIAL

## 2020-11-06 DIAGNOSIS — I10 ESSENTIAL HYPERTENSION: ICD-10-CM

## 2020-11-06 DIAGNOSIS — Z77.011 LEAD EXPOSURE: ICD-10-CM

## 2020-11-06 DIAGNOSIS — E78.49 OTHER HYPERLIPIDEMIA: ICD-10-CM

## 2020-11-06 DIAGNOSIS — Z12.5 PROSTATE CANCER SCREENING: ICD-10-CM

## 2020-11-06 LAB
ALBUMIN SERPL BCP-MCNC: 3.7 G/DL (ref 3.5–5)
ALP SERPL-CCNC: 92 U/L (ref 46–116)
ALT SERPL W P-5'-P-CCNC: 38 U/L (ref 12–78)
ANION GAP SERPL CALCULATED.3IONS-SCNC: 3 MMOL/L (ref 4–13)
AST SERPL W P-5'-P-CCNC: 16 U/L (ref 5–45)
BASOPHILS # BLD AUTO: 0.05 THOUSANDS/ΜL (ref 0–0.1)
BASOPHILS NFR BLD AUTO: 1 % (ref 0–1)
BILIRUB SERPL-MCNC: 0.61 MG/DL (ref 0.2–1)
BUN SERPL-MCNC: 19 MG/DL (ref 5–25)
CALCIUM SERPL-MCNC: 8.9 MG/DL (ref 8.3–10.1)
CHLORIDE SERPL-SCNC: 107 MMOL/L (ref 100–108)
CHOLEST SERPL-MCNC: 211 MG/DL (ref 50–200)
CO2 SERPL-SCNC: 27 MMOL/L (ref 21–32)
CREAT SERPL-MCNC: 1.02 MG/DL (ref 0.6–1.3)
EOSINOPHIL # BLD AUTO: 0.17 THOUSAND/ΜL (ref 0–0.61)
EOSINOPHIL NFR BLD AUTO: 2 % (ref 0–6)
ERYTHROCYTE [DISTWIDTH] IN BLOOD BY AUTOMATED COUNT: 12.8 % (ref 11.6–15.1)
GFR SERPL CREATININE-BSD FRML MDRD: 84 ML/MIN/1.73SQ M
GLUCOSE P FAST SERPL-MCNC: 88 MG/DL (ref 65–99)
HCT VFR BLD AUTO: 47 % (ref 36.5–49.3)
HDLC SERPL-MCNC: 41 MG/DL
HGB BLD-MCNC: 15.6 G/DL (ref 12–17)
IMM GRANULOCYTES # BLD AUTO: 0.04 THOUSAND/UL (ref 0–0.2)
IMM GRANULOCYTES NFR BLD AUTO: 1 % (ref 0–2)
LDLC SERPL CALC-MCNC: 147 MG/DL (ref 0–100)
LYMPHOCYTES # BLD AUTO: 2.15 THOUSANDS/ΜL (ref 0.6–4.47)
LYMPHOCYTES NFR BLD AUTO: 29 % (ref 14–44)
MCH RBC QN AUTO: 28.7 PG (ref 26.8–34.3)
MCHC RBC AUTO-ENTMCNC: 33.2 G/DL (ref 31.4–37.4)
MCV RBC AUTO: 87 FL (ref 82–98)
MONOCYTES # BLD AUTO: 0.74 THOUSAND/ΜL (ref 0.17–1.22)
MONOCYTES NFR BLD AUTO: 10 % (ref 4–12)
NEUTROPHILS # BLD AUTO: 4.26 THOUSANDS/ΜL (ref 1.85–7.62)
NEUTS SEG NFR BLD AUTO: 57 % (ref 43–75)
NRBC BLD AUTO-RTO: 0 /100 WBCS
PLATELET # BLD AUTO: 293 THOUSANDS/UL (ref 149–390)
PMV BLD AUTO: 9.4 FL (ref 8.9–12.7)
POTASSIUM SERPL-SCNC: 4.5 MMOL/L (ref 3.5–5.3)
PROT SERPL-MCNC: 7.3 G/DL (ref 6.4–8.2)
PSA SERPL-MCNC: 1.3 NG/ML (ref 0–4)
RBC # BLD AUTO: 5.43 MILLION/UL (ref 3.88–5.62)
SODIUM SERPL-SCNC: 137 MMOL/L (ref 136–145)
TRIGL SERPL-MCNC: 116 MG/DL
TSH SERPL DL<=0.05 MIU/L-ACNC: 1.43 UIU/ML (ref 0.36–3.74)
WBC # BLD AUTO: 7.41 THOUSAND/UL (ref 4.31–10.16)

## 2020-11-06 PROCEDURE — 80053 COMPREHEN METABOLIC PANEL: CPT

## 2020-11-06 PROCEDURE — 84443 ASSAY THYROID STIM HORMONE: CPT

## 2020-11-06 PROCEDURE — 83655 ASSAY OF LEAD: CPT

## 2020-11-06 PROCEDURE — G0103 PSA SCREENING: HCPCS

## 2020-11-06 PROCEDURE — 85025 COMPLETE CBC W/AUTO DIFF WBC: CPT

## 2020-11-06 PROCEDURE — 36415 COLL VENOUS BLD VENIPUNCTURE: CPT

## 2020-11-06 PROCEDURE — 80061 LIPID PANEL: CPT

## 2020-11-07 LAB — LEAD BLD-MCNC: 8 UG/DL (ref 0–4)

## 2020-11-12 ENCOUNTER — TELEPHONE (OUTPATIENT)
Dept: FAMILY MEDICINE CLINIC | Facility: CLINIC | Age: 55
End: 2020-11-12

## 2020-11-16 ENCOUNTER — APPOINTMENT (OUTPATIENT)
Dept: URGENT CARE | Facility: CLINIC | Age: 55
End: 2020-11-16

## 2020-11-16 DIAGNOSIS — Z23 NEED FOR IMMUNIZATION AGAINST INFLUENZA: Primary | ICD-10-CM

## 2020-11-17 ENCOUNTER — TELEPHONE (OUTPATIENT)
Dept: FAMILY MEDICINE CLINIC | Facility: CLINIC | Age: 55
End: 2020-11-17

## 2021-01-18 ENCOUNTER — TELEPHONE (OUTPATIENT)
Dept: GASTROENTEROLOGY | Facility: CLINIC | Age: 56
End: 2021-01-18

## 2021-01-18 DIAGNOSIS — Z12.11 COLON CANCER SCREENING: Primary | ICD-10-CM

## 2021-01-27 ENCOUNTER — ANESTHESIA EVENT (OUTPATIENT)
Dept: GASTROENTEROLOGY | Facility: HOSPITAL | Age: 56
End: 2021-01-27

## 2021-01-27 NOTE — ANESTHESIA PREPROCEDURE EVALUATION
Procedure:  COLONOSCOPY    Denies CP/SOB with exertion, COPD, asthma, stroke/TIA, seizure    Symptoms of HECTOR never formally worked up no CPAP     Relevant Problems   CARDIO   (+) Essential hypertension   (+) Hyperlipidemia      GI/HEPATIC   (+) Gastroesophageal reflux disease      /RENAL   (+) Cyst of right kidney      MUSCULOSKELETAL   (+) Low back pain   (+) Thoracic spondylosis without myelopathy      PULMONARY   (+) Sleep apnea   (+) Viral URI        Physical Exam    Airway    Mallampati score: II  TM Distance: >3 FB  Neck ROM: full     Dental   No notable dental hx     Cardiovascular      Pulmonary      Other Findings        Anesthesia Plan  ASA Score- 3     Anesthesia Type- IV sedation with anesthesia with ASA Monitors  Additional Monitors:   Airway Plan:           Plan Factors-Exercise tolerance (METS): >4 METS  Chart reviewed  Existing labs reviewed  Patient summary reviewed  Patient is not a current smoker  Induction- intravenous  Postoperative Plan-     Informed Consent- Anesthetic plan and risks discussed with patient  I personally reviewed this patient with the CRNA  Discussed and agreed on the Anesthesia Plan with the CRNA  Mikael Daniels

## 2021-01-28 ENCOUNTER — ANESTHESIA (OUTPATIENT)
Dept: GASTROENTEROLOGY | Facility: HOSPITAL | Age: 56
End: 2021-01-28

## 2021-01-28 ENCOUNTER — HOSPITAL ENCOUNTER (OUTPATIENT)
Dept: GASTROENTEROLOGY | Facility: HOSPITAL | Age: 56
Setting detail: OUTPATIENT SURGERY
Discharge: HOME | End: 2021-01-28
Attending: INTERNAL MEDICINE | Admitting: INTERNAL MEDICINE
Payer: COMMERCIAL

## 2021-01-28 VITALS — HEART RATE: 71 BPM

## 2021-01-28 VITALS
SYSTOLIC BLOOD PRESSURE: 121 MMHG | HEIGHT: 69 IN | TEMPERATURE: 96.9 F | HEART RATE: 71 BPM | OXYGEN SATURATION: 95 % | WEIGHT: 220 LBS | DIASTOLIC BLOOD PRESSURE: 69 MMHG | RESPIRATION RATE: 18 BRPM | BODY MASS INDEX: 32.58 KG/M2

## 2021-01-28 DIAGNOSIS — Z12.11 SCREENING FOR COLON CANCER: ICD-10-CM

## 2021-01-28 PROCEDURE — 45385 COLONOSCOPY W/LESION REMOVAL: CPT | Performed by: INTERNAL MEDICINE

## 2021-01-28 PROCEDURE — 88305 TISSUE EXAM BY PATHOLOGIST: CPT | Performed by: PATHOLOGY

## 2021-01-28 PROCEDURE — 45380 COLONOSCOPY AND BIOPSY: CPT | Performed by: INTERNAL MEDICINE

## 2021-01-28 RX ORDER — LIDOCAINE HYDROCHLORIDE 10 MG/ML
INJECTION, SOLUTION EPIDURAL; INFILTRATION; INTRACAUDAL; PERINEURAL AS NEEDED
Status: DISCONTINUED | OUTPATIENT
Start: 2021-01-28 | End: 2021-01-28

## 2021-01-28 RX ORDER — PROPOFOL 10 MG/ML
INJECTION, EMULSION INTRAVENOUS AS NEEDED
Status: DISCONTINUED | OUTPATIENT
Start: 2021-01-28 | End: 2021-01-28

## 2021-01-28 RX ORDER — PROPOFOL 10 MG/ML
INJECTION, EMULSION INTRAVENOUS CONTINUOUS PRN
Status: DISCONTINUED | OUTPATIENT
Start: 2021-01-28 | End: 2021-01-28

## 2021-01-28 RX ORDER — SODIUM CHLORIDE 9 MG/ML
INJECTION, SOLUTION INTRAVENOUS CONTINUOUS PRN
Status: DISCONTINUED | OUTPATIENT
Start: 2021-01-28 | End: 2021-01-28

## 2021-01-28 RX ADMIN — PROPOFOL 150 MG: 10 INJECTION, EMULSION INTRAVENOUS at 12:51

## 2021-01-28 RX ADMIN — PROPOFOL 50 MG: 10 INJECTION, EMULSION INTRAVENOUS at 13:00

## 2021-01-28 RX ADMIN — PROPOFOL 50 MG: 10 INJECTION, EMULSION INTRAVENOUS at 13:12

## 2021-01-28 RX ADMIN — PROPOFOL 50 MG: 10 INJECTION, EMULSION INTRAVENOUS at 13:06

## 2021-01-28 RX ADMIN — PROPOFOL 50 MG: 10 INJECTION, EMULSION INTRAVENOUS at 12:55

## 2021-01-28 RX ADMIN — PROPOFOL 100 MCG/KG/MIN: 10 INJECTION, EMULSION INTRAVENOUS at 13:15

## 2021-01-28 RX ADMIN — LIDOCAINE HYDROCHLORIDE 50 MG: 10 INJECTION, SOLUTION EPIDURAL; INFILTRATION; INTRACAUDAL; PERINEURAL at 12:51

## 2021-01-28 RX ADMIN — SODIUM CHLORIDE: 0.9 INJECTION, SOLUTION INTRAVENOUS at 12:45

## 2021-01-28 NOTE — H&P
History and Physical -  [facility]  Ijeoma Crespo 50 y o  male MRN: [id number]                  HPI: Ijeoma Crespo is a 50y o  year old male who presents for colonoscopy for colon cancer screening  No previous colonoscopy  No family history of colon cancer  REVIEW OF SYSTEMS: Per the HPI, and otherwise unremarkable  Historical Information   Past Medical History:   Diagnosis Date    GERD (gastroesophageal reflux disease)     Hypertension      Past Surgical History:   Procedure Laterality Date    ROTATOR CUFF REPAIR      SHOULDER SURGERY Left      Social History   Social History     Substance and Sexual Activity   Alcohol Use Yes    Frequency: Monthly or less     Social History     Substance and Sexual Activity   Drug Use Never     Social History     Tobacco Use   Smoking Status Never Smoker   Smokeless Tobacco Never Used     Family History   Problem Relation Age of Onset    Diabetes Mother     Diabetes Brother        Meds/Allergies       Current Outpatient Medications:     amLODIPine (NORVASC) 10 mg tablet    losartan (COZAAR) 100 MG tablet    Na Sulfate-K Sulfate-Mg Sulf 17 5-3 13-1 6 GM/177ML SOLN    omeprazole (PRILOSEC) 20 mg delayed release capsule    EPINEPHrine (EPIPEN 2-KENYA) 0 3 mg/0 3 mL SOAJ    ergocalciferol (VITAMIN D2) 50,000 units    scopolamine (TRANSDERM-SCOP) 1 5 mg/3 days TD 72 hr patch    sucralfate (CARAFATE) 1 g tablet    Allergies   Allergen Reactions    Bee Venom Anaphylaxis    Latex Rash    Other Rash       Objective     /89   Pulse 68   Temp 98 2 °F (36 8 °C) (Tympanic)   Resp 16   Ht 5' 9" (1 753 m)   Wt 99 8 kg (220 lb)   SpO2 98%   BMI 32 49 kg/m²       PHYSICAL EXAM    Gen: NAD  CV: RRR  CHEST: Clear  ABD: soft, NT/ND  EXT: no edema      ASSESSMENT/PLAN:   Ijeoma Crespo is a 50y o  year old male who presents for colonoscopy for colon cancer screening  No previous colonoscopy  No family history of colon cancer   The patient is stable and optimized for the procedure, we reviewed risk and benefits  Risk include but not limited to infection, bleeding, perforation and missing a lesion

## 2021-01-28 NOTE — ANESTHESIA POSTPROCEDURE EVALUATION
Post-Op Assessment Note    CV Status:  Stable  Pain Score: 0    Pain management: adequate     Mental Status:  Arousable   Hydration Status:  Stable   PONV Controlled:  None   Airway Patency:  Patent      Post Op Vitals Reviewed: Yes      Staff: Anesthesiologist, CRNA         No complications documented      BP 93/66 (01/28/21 1339)    Temp (!) 96 9 °F (36 1 °C) (01/28/21 1339)    Pulse 71 (01/28/21 1339)   Resp 18 (01/28/21 1339)    SpO2 95 % (01/28/21 1339)

## 2021-01-29 RX ORDER — LIDOCAINE HYDROCHLORIDE 10 MG/ML
INJECTION, SOLUTION EPIDURAL; INFILTRATION; INTRACAUDAL; PERINEURAL
Status: DISCONTINUED
Start: 2021-01-29 | End: 2021-01-29 | Stop reason: HOSPADM

## 2021-04-26 DIAGNOSIS — I10 ESSENTIAL HYPERTENSION: ICD-10-CM

## 2021-04-26 RX ORDER — AMLODIPINE BESYLATE 10 MG/1
TABLET ORAL
Qty: 90 TABLET | Refills: 1 | Status: SHIPPED | OUTPATIENT
Start: 2021-04-26 | End: 2021-05-07 | Stop reason: SDUPTHER

## 2021-04-30 ENCOUNTER — RA CDI HCC (OUTPATIENT)
Dept: OTHER | Facility: HOSPITAL | Age: 56
End: 2021-04-30

## 2021-04-30 NOTE — PROGRESS NOTES
[place] [id number]  coding opportunities          Chart reviewed, no opportunity found: CHART REVIEWED, NO OPPORTUNITY FOUND                    Provider never responded to [place] [id number]  coding request

## 2021-05-07 ENCOUNTER — OFFICE VISIT (OUTPATIENT)
Dept: FAMILY MEDICINE CLINIC | Facility: CLINIC | Age: 56
End: 2021-05-07
Payer: COMMERCIAL

## 2021-05-07 VITALS
OXYGEN SATURATION: 97 % | BODY MASS INDEX: 33.95 KG/M2 | TEMPERATURE: 97.8 F | WEIGHT: 229.2 LBS | HEIGHT: 69 IN | HEART RATE: 81 BPM | DIASTOLIC BLOOD PRESSURE: 84 MMHG | RESPIRATION RATE: 16 BRPM | SYSTOLIC BLOOD PRESSURE: 130 MMHG

## 2021-05-07 DIAGNOSIS — H93.12 TINNITUS OF LEFT EAR: ICD-10-CM

## 2021-05-07 DIAGNOSIS — Z91.030 BEE STING ALLERGY: ICD-10-CM

## 2021-05-07 DIAGNOSIS — E78.49 OTHER HYPERLIPIDEMIA: ICD-10-CM

## 2021-05-07 DIAGNOSIS — K21.9 GASTROESOPHAGEAL REFLUX DISEASE, UNSPECIFIED WHETHER ESOPHAGITIS PRESENT: Primary | ICD-10-CM

## 2021-05-07 DIAGNOSIS — I10 ESSENTIAL HYPERTENSION: ICD-10-CM

## 2021-05-07 DIAGNOSIS — G47.30 SLEEP APNEA, UNSPECIFIED TYPE: ICD-10-CM

## 2021-05-07 DIAGNOSIS — R42 DIZZINESSES: ICD-10-CM

## 2021-05-07 PROCEDURE — 3008F BODY MASS INDEX DOCD: CPT | Performed by: FAMILY MEDICINE

## 2021-05-07 PROCEDURE — 99214 OFFICE O/P EST MOD 30 MIN: CPT | Performed by: FAMILY MEDICINE

## 2021-05-07 PROCEDURE — 3725F SCREEN DEPRESSION PERFORMED: CPT | Performed by: FAMILY MEDICINE

## 2021-05-07 PROCEDURE — 3075F SYST BP GE 130 - 139MM HG: CPT | Performed by: FAMILY MEDICINE

## 2021-05-07 PROCEDURE — 3079F DIAST BP 80-89 MM HG: CPT | Performed by: FAMILY MEDICINE

## 2021-05-07 PROCEDURE — 1036F TOBACCO NON-USER: CPT | Performed by: FAMILY MEDICINE

## 2021-05-07 RX ORDER — PREDNISONE 10 MG/1
TABLET ORAL
COMMUNITY
Start: 2021-04-28

## 2021-05-07 RX ORDER — EPINEPHRINE 0.3 MG/.3ML
0.3 INJECTION SUBCUTANEOUS ONCE
Qty: 0.6 ML | Refills: 1 | Status: SHIPPED | OUTPATIENT
Start: 2021-05-07 | End: 2021-05-07

## 2021-05-07 RX ORDER — AMLODIPINE BESYLATE 10 MG/1
10 TABLET ORAL DAILY
Qty: 90 TABLET | Refills: 1 | Status: SHIPPED | OUTPATIENT
Start: 2021-05-07 | End: 2022-01-11

## 2021-05-07 RX ORDER — LOSARTAN POTASSIUM 100 MG/1
100 TABLET ORAL DAILY
Qty: 30 TABLET | Refills: 5 | Status: SHIPPED | OUTPATIENT
Start: 2021-05-07 | End: 2021-11-05

## 2021-05-07 NOTE — ASSESSMENT & PLAN NOTE
Borderline high LDL  Continue to encourage low fat diet, exercise, and weight loss  Will continue to monitor  Updated labs to be obtained prior to next visit in 6 months

## 2021-05-07 NOTE — ASSESSMENT & PLAN NOTE
Associated with tinnitus and SNHL  Concern for possible Meniere's disease  Recommend he notify his ENT provider of current symptoms  Continue with current prednisone management  Will continue to monitor  Call or return to office if symptoms worsen

## 2021-05-07 NOTE — PROGRESS NOTES
Assessment/Plan:    Gastroesophageal reflux disease  Stable  Continue omeprazole 20mg  Will continue to monitor  Sleep apnea  Has been referred to a sleep center for HECTOR evaluation  Bee sting allergy  Provided rx for epi pen  Hyperlipidemia  Borderline high LDL  Continue to encourage low fat diet, exercise, and weight loss  Will continue to monitor  Updated labs to be obtained prior to next visit in 6 months  Essential hypertension  Well controlled  Continue amlodipine and losartan  Will continue to monitor  Tinnitus of left ear  Currently being treated by ENT with prednisone taper  Continue management per ENT  Will continue to monitor  Dizzinesses  Associated with tinnitus and SNHL  Concern for possible Meniere's disease  Recommend he notify his ENT provider of current symptoms  Continue with current prednisone management  Will continue to monitor  Call or return to office if symptoms worsen  Problem List Items Addressed This Visit        Digestive    Gastroesophageal reflux disease - Primary     Stable  Continue omeprazole 20mg  Will continue to monitor  Respiratory    Sleep apnea     Has been referred to a sleep center for HECTOR evaluation  Cardiovascular and Mediastinum    Essential hypertension     Well controlled  Continue amlodipine and losartan  Will continue to monitor  Relevant Medications    EPINEPHrine (EpiPen 2-Moo) 0 3 mg/0 3 mL SOAJ    losartan (COZAAR) 100 MG tablet    amLODIPine (NORVASC) 10 mg tablet    Other Relevant Orders    CBC and differential    Comprehensive metabolic panel    Lipid Panel with Direct LDL reflex    TSH, 3rd generation with Free T4 reflex       Other    Bee sting allergy     Provided rx for epi pen  Relevant Medications    EPINEPHrine (EpiPen 2-Moo) 0 3 mg/0 3 mL SOAJ    Hyperlipidemia     Borderline high LDL  Continue to encourage low fat diet, exercise, and weight loss  Will continue to monitor   Updated labs to be obtained prior to next visit in 6 months  Relevant Orders    CBC and differential    Comprehensive metabolic panel    Lipid Panel with Direct LDL reflex    TSH, 3rd generation with Free T4 reflex    Tinnitus of left ear     Currently being treated by ENT with prednisone taper  Continue management per ENT  Will continue to monitor  Dizzinesses     Associated with tinnitus and SNHL  Concern for possible Meniere's disease  Recommend he notify his ENT provider of current symptoms  Continue with current prednisone management  Will continue to monitor  Call or return to office if symptoms worsen  Other Visit Diagnoses     BMI 33 0-33 9,adult                Subjective:      Patient ID: Keon Shaw is a 50 y o  male  He presents today for follow up of multiple medical problems  He followed up with ENT for tinnitus and was also found to have sensorineural hearing loss  He was given prednisone 10mg taper and will follow up in four weeks  He was again referred to the sleep center for evaluation of snoring and HECTOR  He also had his colonoscopy completed which showed 3 benign polyps and one adenomatous polyp  He will be due to repeat colonoscopy in 5 years  He notes that he also continues to experience SOB with activity as a sequela of his COVID-19 infection 15 months ago  No SOB at rest and no cough  He has also been experiencing pain in multiple joints which occurred as a result of the COVID infection, but this improved with the prednisone  He is now having intermittent room spinning dizziness, as as well a sensation that he is swaying that lasts for about 5-10 seconds at a time  Reports that it not associated with positional changes, but seems tot happens more in the morning  No vision changes, nausea, or vomiting  He has not received the COVID vaccines or shingles vaccine  He continues to take all medications as directed without issue         The following portions of the patient's history were reviewed and updated as appropriate: allergies, current medications, past family history, past medical history, past social history, past surgical history and problem list     Review of Systems   Constitutional: Negative for chills and fever  HENT: Negative for trouble swallowing  Eyes: Negative for visual disturbance  Respiratory: Negative for cough and shortness of breath  Cardiovascular: Negative for chest pain and palpitations  Gastrointestinal: Negative for abdominal pain, blood in stool and vomiting  Endocrine: Negative for cold intolerance and heat intolerance  Genitourinary: Negative for difficulty urinating and dysuria  Musculoskeletal: Negative for gait problem  Skin: Negative for rash  Neurological: Positive for dizziness  Negative for syncope and headaches  Hematological: Negative for adenopathy  Psychiatric/Behavioral: Negative for behavioral problems  Objective:      /84 (BP Location: Left arm, Patient Position: Sitting, Cuff Size: Large)   Pulse 81   Temp 97 8 °F (36 6 °C) (Tympanic)   Resp 16   Ht 5' 9" (1 753 m)   Wt 104 kg (229 lb 3 2 oz)   SpO2 97%   BMI 33 85 kg/m²          Physical Exam  Vitals signs and nursing note reviewed  Constitutional:       Appearance: He is well-developed  HENT:      Head: Normocephalic and atraumatic  Eyes:      Pupils: Pupils are equal, round, and reactive to light  Neck:      Musculoskeletal: Normal range of motion and neck supple  Cardiovascular:      Rate and Rhythm: Normal rate and regular rhythm  Heart sounds: Normal heart sounds  Pulmonary:      Effort: Pulmonary effort is normal       Breath sounds: Normal breath sounds  Abdominal:      General: Bowel sounds are normal       Palpations: Abdomen is soft  Musculoskeletal: Normal range of motion  Lymphadenopathy:      Cervical: No cervical adenopathy  Skin:     General: Skin is warm  Findings: No rash     Neurological: Mental Status: He is alert and oriented to person, place, and time  Cranial Nerves: No cranial nerve deficit  BMI Counseling: Body mass index is 33 85 kg/m²  The BMI is above normal  Nutrition recommendations include reducing portion sizes, decreasing overall calorie intake and 3-5 servings of fruits/vegetables daily  Exercise recommendations include moderate aerobic physical activity for 150 minutes/week

## 2021-05-07 NOTE — ASSESSMENT & PLAN NOTE
Currently being treated by ENT with prednisone taper  Continue management per ENT  Will continue to monitor

## 2021-06-29 ENCOUNTER — APPOINTMENT (OUTPATIENT)
Dept: LAB | Age: 56
End: 2021-06-29
Payer: COMMERCIAL

## 2021-06-29 DIAGNOSIS — I10 ESSENTIAL HYPERTENSION: ICD-10-CM

## 2021-06-29 DIAGNOSIS — E78.49 OTHER HYPERLIPIDEMIA: ICD-10-CM

## 2021-06-29 LAB
ALBUMIN SERPL BCP-MCNC: 3.7 G/DL (ref 3.5–5)
ALP SERPL-CCNC: 90 U/L (ref 46–116)
ALT SERPL W P-5'-P-CCNC: 51 U/L (ref 12–78)
ANION GAP SERPL CALCULATED.3IONS-SCNC: 5 MMOL/L (ref 4–13)
AST SERPL W P-5'-P-CCNC: 23 U/L (ref 5–45)
BASOPHILS # BLD AUTO: 0.05 THOUSANDS/ΜL (ref 0–0.1)
BASOPHILS NFR BLD AUTO: 1 % (ref 0–1)
BILIRUB SERPL-MCNC: 0.64 MG/DL (ref 0.2–1)
BUN SERPL-MCNC: 18 MG/DL (ref 5–25)
CALCIUM SERPL-MCNC: 9 MG/DL (ref 8.3–10.1)
CHLORIDE SERPL-SCNC: 107 MMOL/L (ref 100–108)
CHOLEST SERPL-MCNC: 200 MG/DL (ref 50–200)
CO2 SERPL-SCNC: 27 MMOL/L (ref 21–32)
CREAT SERPL-MCNC: 0.93 MG/DL (ref 0.6–1.3)
EOSINOPHIL # BLD AUTO: 0.23 THOUSAND/ΜL (ref 0–0.61)
EOSINOPHIL NFR BLD AUTO: 3 % (ref 0–6)
ERYTHROCYTE [DISTWIDTH] IN BLOOD BY AUTOMATED COUNT: 13.1 % (ref 11.6–15.1)
GFR SERPL CREATININE-BSD FRML MDRD: 93 ML/MIN/1.73SQ M
GLUCOSE P FAST SERPL-MCNC: 89 MG/DL (ref 65–99)
HCT VFR BLD AUTO: 46.9 % (ref 36.5–49.3)
HDLC SERPL-MCNC: 35 MG/DL
HGB BLD-MCNC: 15.7 G/DL (ref 12–17)
IMM GRANULOCYTES # BLD AUTO: 0.04 THOUSAND/UL (ref 0–0.2)
IMM GRANULOCYTES NFR BLD AUTO: 1 % (ref 0–2)
LDLC SERPL CALC-MCNC: 135 MG/DL (ref 0–100)
LYMPHOCYTES # BLD AUTO: 1.77 THOUSANDS/ΜL (ref 0.6–4.47)
LYMPHOCYTES NFR BLD AUTO: 25 % (ref 14–44)
MCH RBC QN AUTO: 28.6 PG (ref 26.8–34.3)
MCHC RBC AUTO-ENTMCNC: 33.5 G/DL (ref 31.4–37.4)
MCV RBC AUTO: 85 FL (ref 82–98)
MONOCYTES # BLD AUTO: 0.6 THOUSAND/ΜL (ref 0.17–1.22)
MONOCYTES NFR BLD AUTO: 8 % (ref 4–12)
NEUTROPHILS # BLD AUTO: 4.5 THOUSANDS/ΜL (ref 1.85–7.62)
NEUTS SEG NFR BLD AUTO: 62 % (ref 43–75)
NRBC BLD AUTO-RTO: 0 /100 WBCS
PLATELET # BLD AUTO: 286 THOUSANDS/UL (ref 149–390)
PMV BLD AUTO: 9.4 FL (ref 8.9–12.7)
POTASSIUM SERPL-SCNC: 3.9 MMOL/L (ref 3.5–5.3)
PROT SERPL-MCNC: 7.3 G/DL (ref 6.4–8.2)
RBC # BLD AUTO: 5.49 MILLION/UL (ref 3.88–5.62)
SODIUM SERPL-SCNC: 139 MMOL/L (ref 136–145)
TRIGL SERPL-MCNC: 152 MG/DL
TSH SERPL DL<=0.05 MIU/L-ACNC: 1.83 UIU/ML (ref 0.36–3.74)
WBC # BLD AUTO: 7.19 THOUSAND/UL (ref 4.31–10.16)

## 2021-06-29 PROCEDURE — 36415 COLL VENOUS BLD VENIPUNCTURE: CPT

## 2021-06-29 PROCEDURE — 84443 ASSAY THYROID STIM HORMONE: CPT

## 2021-06-29 PROCEDURE — 80061 LIPID PANEL: CPT

## 2021-06-29 PROCEDURE — 80053 COMPREHEN METABOLIC PANEL: CPT

## 2021-06-29 PROCEDURE — 85025 COMPLETE CBC W/AUTO DIFF WBC: CPT

## 2021-06-30 ENCOUNTER — TELEPHONE (OUTPATIENT)
Dept: FAMILY MEDICINE CLINIC | Facility: CLINIC | Age: 56
End: 2021-06-30

## 2021-06-30 NOTE — TELEPHONE ENCOUNTER
----- Message from [address] sent at 6/30/2021  9:02 AM EDT -----  -  Your blood work shows elevated LDL cholesterol  This is improved from previous  Triglycerides are elevated from before  I recommend lifestyle modifications for now including low-fat diet regular exercise  -   The rest of your labs are stable

## 2021-08-30 ENCOUNTER — OFFICE VISIT (OUTPATIENT)
Dept: FAMILY MEDICINE CLINIC | Facility: CLINIC | Age: 56
End: 2021-08-30
Payer: COMMERCIAL

## 2021-08-30 VITALS
WEIGHT: 230 LBS | TEMPERATURE: 97.6 F | HEIGHT: 69 IN | HEART RATE: 81 BPM | SYSTOLIC BLOOD PRESSURE: 132 MMHG | BODY MASS INDEX: 34.07 KG/M2 | OXYGEN SATURATION: 97 % | DIASTOLIC BLOOD PRESSURE: 88 MMHG | RESPIRATION RATE: 16 BRPM

## 2021-08-30 DIAGNOSIS — R22.32 LOCALIZED SWELLING ON LEFT HAND: Primary | ICD-10-CM

## 2021-08-30 DIAGNOSIS — I10 ESSENTIAL HYPERTENSION: ICD-10-CM

## 2021-08-30 DIAGNOSIS — M25.50 ARTHRALGIA, UNSPECIFIED JOINT: ICD-10-CM

## 2021-08-30 PROCEDURE — 99214 OFFICE O/P EST MOD 30 MIN: CPT | Performed by: NURSE PRACTITIONER

## 2021-08-30 PROCEDURE — 3008F BODY MASS INDEX DOCD: CPT | Performed by: NURSE PRACTITIONER

## 2021-08-30 PROCEDURE — 1036F TOBACCO NON-USER: CPT | Performed by: NURSE PRACTITIONER

## 2021-08-30 PROCEDURE — 3075F SYST BP GE 130 - 139MM HG: CPT | Performed by: NURSE PRACTITIONER

## 2021-08-30 PROCEDURE — 3079F DIAST BP 80-89 MM HG: CPT | Performed by: NURSE PRACTITIONER

## 2021-08-30 RX ORDER — CEPHALEXIN 500 MG/1
500 CAPSULE ORAL EVERY 8 HOURS SCHEDULED
Qty: 21 CAPSULE | Refills: 0 | Status: SHIPPED | OUTPATIENT
Start: 2021-08-30 | End: 2021-09-06

## 2021-08-30 RX ORDER — PREDNISONE 50 MG/1
50 TABLET ORAL DAILY
Qty: 5 TABLET | Refills: 0 | Status: SHIPPED | OUTPATIENT
Start: 2021-08-30 | End: 2021-09-04

## 2021-08-30 NOTE — ASSESSMENT & PLAN NOTE
-  Possible infection verses gout  -  Will check CRP and uric acid  -  Prescription sent for Keflex  Advised of side effects   -  Prescription sent for prednisone 50 mg daily for 5 days  Take in the morning with food  -  Contact office with any worsening symptoms, fever, or chills

## 2021-08-30 NOTE — PROGRESS NOTES
Assessment/Plan:    Arthralgia  -  Continue over-the-counter Motrin or Tylenol as package directs  -  Will check VICENTE, CRP, and Lyme antibody  Localized swelling on left hand  -  Possible infection verses gout  -  Will check CRP and uric acid  -  Prescription sent for Keflex  Advised of side effects   -  Prescription sent for prednisone 50 mg daily for 5 days  Take in the morning with food  -  Contact office with any worsening symptoms, fever, or chills  Essential hypertension  -  Well controlled  Continue same  Will continue to monitor  Diagnoses and all orders for this visit:    Localized swelling on left hand  -     C-reactive protein; Future  -     Uric acid; Future  -     predniSONE 50 mg tablet; Take 1 tablet (50 mg total) by mouth daily for 5 days  -     cephalexin (KEFLEX) 500 mg capsule; Take 1 capsule (500 mg total) by mouth every 8 (eight) hours for 7 days    Arthralgia, unspecified joint  -     Lyme Total Antibody Profile with reflex to WB; Future  -     VICENTE Screen w/ Reflex to Titer/Pattern; Future    Essential hypertension        Subjective:      Patient ID: Viet Escamilla is a 50 y o  male  Patient presents today with complaints of pain and swelling in his left palm and left index finger  He states that 1 5 months ago, he cut his finger with a   It initially was not swollen at the time  He did try and clean it out  Since then he has been working in sweaty gloves and is worried he may have an infection  He has been applying ice which does not help  He has been taking over-the-counter Motrin which helps a little bit  He denies any fevers or chills  He does also admit to generalized joint pain that has been occurring for the past year  Patient states that he does live in the [facility] and is concerned of possible Lyme disease  M*Modal software was used to dictate this note  It may contain errors with dictating incorrect words/spelling   Please contact provider directly for any questions  The following portions of the patient's history were reviewed and updated as appropriate: allergies, current medications, past family history, past medical history, past social history, past surgical history and problem list     Review of Systems   Constitutional: Negative for chills, fatigue and fever  HENT: Negative for trouble swallowing  Eyes: Negative for visual disturbance  Respiratory: Negative for cough and shortness of breath  Cardiovascular: Negative for chest pain and palpitations  Gastrointestinal: Negative for abdominal pain and blood in stool  Endocrine: Negative for cold intolerance and heat intolerance  Genitourinary: Negative for difficulty urinating and dysuria  Musculoskeletal: Positive for arthralgias (  Generalized)  Negative for gait problem  Skin: Negative for rash and wound  Pain and swelling of left index finger and palm  Neurological: Negative for dizziness, syncope and headaches  Hematological: Negative for adenopathy  Psychiatric/Behavioral: Negative for behavioral problems  Objective:      /88 (BP Location: Left arm, Patient Position: Sitting, Cuff Size: Large)   Pulse 81   Temp 97 6 °F (36 4 °C) (Tympanic)   Resp 16   Ht 5' 9" (1 753 m)   Wt 104 kg (230 lb)   SpO2 97%   BMI 33 97 kg/m²          Physical Exam  Vitals and nursing note reviewed  Constitutional:       Appearance: Normal appearance  HENT:      Head: Normocephalic and atraumatic  Right Ear: External ear normal       Left Ear: External ear normal    Eyes:      Conjunctiva/sclera: Conjunctivae normal    Cardiovascular:      Rate and Rhythm: Normal rate and regular rhythm  Heart sounds: Normal heart sounds  Pulmonary:      Effort: Pulmonary effort is normal       Breath sounds: Normal breath sounds  Musculoskeletal:         General: Normal range of motion        Left hand: Swelling ( there is some swelling of the left index finger extending into the palm  No redness noted ) present  Cervical back: Normal range of motion  Comments:  Slight erythema of PIP joint on the index finger of left hand  Skin:     General: Skin is warm and dry  Neurological:      Mental Status: He is alert and oriented to person, place, and time  Cranial Nerves: No cranial nerve deficit     Psychiatric:         Mood and Affect: Mood normal          Behavior: Behavior normal

## 2021-08-30 NOTE — ASSESSMENT & PLAN NOTE
-  Continue over-the-counter Motrin or Tylenol as package directs  -  Will check VICENTE, CRP, and Lyme antibody

## 2021-08-31 ENCOUNTER — APPOINTMENT (OUTPATIENT)
Dept: LAB | Age: 56
End: 2021-08-31
Payer: COMMERCIAL

## 2021-08-31 DIAGNOSIS — M25.50 ARTHRALGIA, UNSPECIFIED JOINT: ICD-10-CM

## 2021-08-31 DIAGNOSIS — R22.32 LOCALIZED SWELLING ON LEFT HAND: ICD-10-CM

## 2021-08-31 LAB
CRP SERPL QL: 4.2 MG/L
URATE SERPL-MCNC: 6.1 MG/DL (ref 4.2–8)

## 2021-08-31 PROCEDURE — 84550 ASSAY OF BLOOD/URIC ACID: CPT

## 2021-08-31 PROCEDURE — 86038 ANTINUCLEAR ANTIBODIES: CPT

## 2021-08-31 PROCEDURE — 86618 LYME DISEASE ANTIBODY: CPT

## 2021-08-31 PROCEDURE — 86140 C-REACTIVE PROTEIN: CPT

## 2021-08-31 PROCEDURE — 36415 COLL VENOUS BLD VENIPUNCTURE: CPT

## 2021-09-01 ENCOUNTER — TELEPHONE (OUTPATIENT)
Dept: FAMILY MEDICINE CLINIC | Facility: CLINIC | Age: 56
End: 2021-09-01

## 2021-09-01 LAB
B BURGDOR IGG+IGM SER-ACNC: 38
RYE IGE QN: NEGATIVE

## 2021-09-01 NOTE — TELEPHONE ENCOUNTER
----- Message from [address] sent at 9/1/2021 11:22 AM EDT -----  -  Your blood work shows a normal uric acid level  This was the test used to check for gout  -  Lyme antibodies negative  -  VICENTE is negative  This was the test we checked to look for any autoimmune disease   -  Your CRP is slightly elevated  This signifies some sort of inflammation which could be caused by multiple things  Could be related to possible infection in the hand  We will continue to monitor

## 2021-10-12 ENCOUNTER — HOSPITAL ENCOUNTER (OUTPATIENT)
Dept: RADIOLOGY | Facility: IMAGING CENTER | Age: 56
Discharge: HOME | End: 2021-10-12
Payer: COMMERCIAL

## 2021-10-12 ENCOUNTER — OFFICE VISIT (OUTPATIENT)
Dept: FAMILY MEDICINE CLINIC | Facility: CLINIC | Age: 56
End: 2021-10-12
Payer: COMMERCIAL

## 2021-10-12 VITALS
SYSTOLIC BLOOD PRESSURE: 112 MMHG | BODY MASS INDEX: 34 KG/M2 | TEMPERATURE: 98.1 F | HEIGHT: 69 IN | OXYGEN SATURATION: 97 % | RESPIRATION RATE: 14 BRPM | WEIGHT: 229.6 LBS | DIASTOLIC BLOOD PRESSURE: 78 MMHG | HEART RATE: 77 BPM

## 2021-10-12 DIAGNOSIS — M79.642 PAIN OF LEFT HAND: ICD-10-CM

## 2021-10-12 DIAGNOSIS — E78.49 OTHER HYPERLIPIDEMIA: ICD-10-CM

## 2021-10-12 DIAGNOSIS — I10 ESSENTIAL HYPERTENSION: ICD-10-CM

## 2021-10-12 DIAGNOSIS — Z00.00 HEALTHCARE MAINTENANCE: ICD-10-CM

## 2021-10-12 DIAGNOSIS — M25.59 PAIN IN OTHER JOINT: Primary | ICD-10-CM

## 2021-10-12 DIAGNOSIS — Z12.5 PROSTATE CANCER SCREENING: ICD-10-CM

## 2021-10-12 PROCEDURE — 1036F TOBACCO NON-USER: CPT | Performed by: FAMILY MEDICINE

## 2021-10-12 PROCEDURE — 73130 X-RAY EXAM OF HAND: CPT

## 2021-10-12 PROCEDURE — 3074F SYST BP LT 130 MM HG: CPT | Performed by: FAMILY MEDICINE

## 2021-10-12 PROCEDURE — 3008F BODY MASS INDEX DOCD: CPT | Performed by: FAMILY MEDICINE

## 2021-10-12 PROCEDURE — 3078F DIAST BP <80 MM HG: CPT | Performed by: FAMILY MEDICINE

## 2021-10-12 PROCEDURE — 99396 PREV VISIT EST AGE 40-64: CPT | Performed by: FAMILY MEDICINE

## 2021-10-12 RX ORDER — IBUPROFEN 200 MG
200 TABLET ORAL EVERY 6 HOURS PRN
COMMUNITY

## 2021-10-12 RX ORDER — PREDNISONE 50 MG/1
50 TABLET ORAL DAILY
Qty: 5 TABLET | Refills: 0 | Status: SHIPPED | OUTPATIENT
Start: 2021-10-12 | End: 2021-10-17

## 2021-10-25 ENCOUNTER — TELEPHONE (OUTPATIENT)
Dept: FAMILY MEDICINE CLINIC | Facility: CLINIC | Age: 56
End: 2021-10-25

## 2021-11-05 DIAGNOSIS — I10 ESSENTIAL HYPERTENSION: ICD-10-CM

## 2021-11-05 RX ORDER — LOSARTAN POTASSIUM 100 MG/1
TABLET ORAL
Qty: 90 TABLET | Refills: 1 | Status: SHIPPED | OUTPATIENT
Start: 2021-11-05 | End: 2022-04-19 | Stop reason: SDUPTHER

## 2021-12-03 ENCOUNTER — APPOINTMENT (OUTPATIENT)
Dept: LAB | Age: 56
End: 2021-12-03
Payer: COMMERCIAL

## 2021-12-03 DIAGNOSIS — Z79.899 ENCOUNTER FOR LONG-TERM (CURRENT) USE OF OTHER MEDICATIONS: ICD-10-CM

## 2021-12-03 DIAGNOSIS — M25.541 ARTHRALGIA OF RIGHT HAND: ICD-10-CM

## 2021-12-03 LAB
CRP SERPL QL: 9.3 MG/L
ERYTHROCYTE [SEDIMENTATION RATE] IN BLOOD: 23 MM/HOUR (ref 0–19)

## 2021-12-03 PROCEDURE — 36415 COLL VENOUS BLD VENIPUNCTURE: CPT

## 2021-12-03 PROCEDURE — 86430 RHEUMATOID FACTOR TEST QUAL: CPT

## 2021-12-03 PROCEDURE — 86431 RHEUMATOID FACTOR QUANT: CPT

## 2021-12-03 PROCEDURE — 86200 CCP ANTIBODY: CPT

## 2021-12-03 PROCEDURE — 86140 C-REACTIVE PROTEIN: CPT

## 2021-12-03 PROCEDURE — 85652 RBC SED RATE AUTOMATED: CPT

## 2021-12-06 LAB
CRYOGLOB RF SER-ACNC: ABNORMAL [IU]/ML
RHEUMATOID FACT SER QL LA: POSITIVE

## 2021-12-07 LAB — CCP AB SER IA-ACNC: >340

## 2022-01-11 DIAGNOSIS — I10 ESSENTIAL HYPERTENSION: ICD-10-CM

## 2022-01-11 RX ORDER — AMLODIPINE BESYLATE 10 MG/1
TABLET ORAL
Qty: 90 TABLET | Refills: 1 | Status: SHIPPED | OUTPATIENT
Start: 2022-01-11 | End: 2022-07-13

## 2022-03-22 ENCOUNTER — APPOINTMENT (OUTPATIENT)
Dept: LAB | Age: 57
End: 2022-03-22
Payer: COMMERCIAL

## 2022-03-22 DIAGNOSIS — M06.89 OTHER SPECIFIED RHEUMATOID ARTHRITIS, MULTIPLE SITES (HCC): ICD-10-CM

## 2022-03-22 DIAGNOSIS — Z79.899 ENCOUNTER FOR LONG-TERM (CURRENT) USE OF OTHER MEDICATIONS: ICD-10-CM

## 2022-03-22 DIAGNOSIS — E78.49 OTHER HYPERLIPIDEMIA: ICD-10-CM

## 2022-03-22 DIAGNOSIS — Z12.5 PROSTATE CANCER SCREENING: ICD-10-CM

## 2022-03-22 LAB
ALBUMIN SERPL BCP-MCNC: 3.9 G/DL (ref 3.5–5)
ALP SERPL-CCNC: 87 U/L (ref 46–116)
ALT SERPL W P-5'-P-CCNC: 59 U/L (ref 12–78)
ANION GAP SERPL CALCULATED.3IONS-SCNC: 5 MMOL/L (ref 4–13)
AST SERPL W P-5'-P-CCNC: 26 U/L (ref 5–45)
BASOPHILS # BLD AUTO: 0.05 THOUSANDS/ΜL (ref 0–0.1)
BASOPHILS NFR BLD AUTO: 1 % (ref 0–1)
BILIRUB SERPL-MCNC: 0.72 MG/DL (ref 0.2–1)
BUN SERPL-MCNC: 19 MG/DL (ref 5–25)
CALCIUM SERPL-MCNC: 9 MG/DL (ref 8.3–10.1)
CHLORIDE SERPL-SCNC: 106 MMOL/L (ref 100–108)
CHOLEST SERPL-MCNC: 217 MG/DL
CO2 SERPL-SCNC: 25 MMOL/L (ref 21–32)
CREAT SERPL-MCNC: 1.1 MG/DL (ref 0.6–1.3)
CRP SERPL QL: 3.4 MG/L
EOSINOPHIL # BLD AUTO: 0.22 THOUSAND/ΜL (ref 0–0.61)
EOSINOPHIL NFR BLD AUTO: 3 % (ref 0–6)
ERYTHROCYTE [DISTWIDTH] IN BLOOD BY AUTOMATED COUNT: 13.3 % (ref 11.6–15.1)
ERYTHROCYTE [SEDIMENTATION RATE] IN BLOOD: 12 MM/HOUR (ref 0–19)
GFR SERPL CREATININE-BSD FRML MDRD: 75 ML/MIN/1.73SQ M
GLUCOSE SERPL-MCNC: 107 MG/DL (ref 65–140)
HBV SURFACE AG SER QL: NORMAL
HCT VFR BLD AUTO: 45.2 % (ref 36.5–49.3)
HCV AB SER QL: NORMAL
HDLC SERPL-MCNC: 33 MG/DL
HGB BLD-MCNC: 15.6 G/DL (ref 12–17)
IMM GRANULOCYTES # BLD AUTO: 0.02 THOUSAND/UL (ref 0–0.2)
IMM GRANULOCYTES NFR BLD AUTO: 0 % (ref 0–2)
LDLC SERPL CALC-MCNC: 154 MG/DL (ref 0–100)
LYMPHOCYTES # BLD AUTO: 1.76 THOUSANDS/ΜL (ref 0.6–4.47)
LYMPHOCYTES NFR BLD AUTO: 28 % (ref 14–44)
MCH RBC QN AUTO: 29.5 PG (ref 26.8–34.3)
MCHC RBC AUTO-ENTMCNC: 34.5 G/DL (ref 31.4–37.4)
MCV RBC AUTO: 86 FL (ref 82–98)
MONOCYTES # BLD AUTO: 0.57 THOUSAND/ΜL (ref 0.17–1.22)
MONOCYTES NFR BLD AUTO: 9 % (ref 4–12)
NEUTROPHILS # BLD AUTO: 3.78 THOUSANDS/ΜL (ref 1.85–7.62)
NEUTS SEG NFR BLD AUTO: 59 % (ref 43–75)
NRBC BLD AUTO-RTO: 0 /100 WBCS
PLATELET # BLD AUTO: 307 THOUSANDS/UL (ref 149–390)
PMV BLD AUTO: 9.4 FL (ref 8.9–12.7)
POTASSIUM SERPL-SCNC: 4 MMOL/L (ref 3.5–5.3)
PROT SERPL-MCNC: 7.5 G/DL (ref 6.4–8.2)
PSA SERPL-MCNC: 1.7 NG/ML (ref 0–4)
RBC # BLD AUTO: 5.28 MILLION/UL (ref 3.88–5.62)
SODIUM SERPL-SCNC: 136 MMOL/L (ref 136–145)
TRIGL SERPL-MCNC: 151 MG/DL
TSH SERPL DL<=0.05 MIU/L-ACNC: 2.43 UIU/ML (ref 0.36–3.74)
WBC # BLD AUTO: 6.4 THOUSAND/UL (ref 4.31–10.16)

## 2022-03-22 PROCEDURE — 86140 C-REACTIVE PROTEIN: CPT

## 2022-03-22 PROCEDURE — 80061 LIPID PANEL: CPT

## 2022-03-22 PROCEDURE — 80053 COMPREHEN METABOLIC PANEL: CPT

## 2022-03-22 PROCEDURE — 36415 COLL VENOUS BLD VENIPUNCTURE: CPT

## 2022-03-22 PROCEDURE — G0103 PSA SCREENING: HCPCS

## 2022-03-22 PROCEDURE — 85025 COMPLETE CBC W/AUTO DIFF WBC: CPT

## 2022-03-22 PROCEDURE — 86803 HEPATITIS C AB TEST: CPT

## 2022-03-22 PROCEDURE — 87340 HEPATITIS B SURFACE AG IA: CPT

## 2022-03-22 PROCEDURE — 85652 RBC SED RATE AUTOMATED: CPT

## 2022-03-22 PROCEDURE — 86480 TB TEST CELL IMMUN MEASURE: CPT

## 2022-03-22 PROCEDURE — 84443 ASSAY THYROID STIM HORMONE: CPT

## 2022-03-24 LAB
GAMMA INTERFERON BACKGROUND BLD IA-ACNC: 0.02 IU/ML
M TB IFN-G BLD-IMP: NEGATIVE
M TB IFN-G CD4+ BCKGRND COR BLD-ACNC: 0 IU/ML
M TB IFN-G CD4+ BCKGRND COR BLD-ACNC: 0 IU/ML
MITOGEN IGNF BCKGRD COR BLD-ACNC: >10 IU/ML

## 2022-03-25 ENCOUNTER — TELEPHONE (OUTPATIENT)
Dept: FAMILY MEDICINE CLINIC | Facility: CLINIC | Age: 57
End: 2022-03-25

## 2022-03-25 NOTE — TELEPHONE ENCOUNTER
I sent result message to pt through his my chart  He is to call with any questions or it will be discussed at his upcoming visit

## 2022-03-25 NOTE — TELEPHONE ENCOUNTER
----- Message from Eldridge Olszewski, MD sent at 3/25/2022  7:02 AM EDT -----  Blood work showed high cholesterol  I will discuss treatment options with patient in his next visit

## 2022-04-01 ENCOUNTER — RA CDI HCC (OUTPATIENT)
Dept: OTHER | Facility: HOSPITAL | Age: 57
End: 2022-04-01

## 2022-04-01 NOTE — PROGRESS NOTES
[facility]  coding opportunities       Chart reviewed, no opportunity found: CHART REVIEWED, NO OPPORTUNITY FOUND        Patients Insurance        Commercial Insurance: [employer]

## 2022-04-08 ENCOUNTER — OFFICE VISIT (OUTPATIENT)
Dept: FAMILY MEDICINE CLINIC | Facility: CLINIC | Age: 57
End: 2022-04-08
Payer: COMMERCIAL

## 2022-04-08 VITALS
HEART RATE: 77 BPM | TEMPERATURE: 99 F | WEIGHT: 235.8 LBS | BODY MASS INDEX: 34.93 KG/M2 | SYSTOLIC BLOOD PRESSURE: 118 MMHG | DIASTOLIC BLOOD PRESSURE: 80 MMHG | HEIGHT: 69 IN | RESPIRATION RATE: 16 BRPM | OXYGEN SATURATION: 96 %

## 2022-04-08 DIAGNOSIS — G47.33 SEVERE OBSTRUCTIVE SLEEP APNEA: ICD-10-CM

## 2022-04-08 DIAGNOSIS — R53.82 CHRONIC FATIGUE: ICD-10-CM

## 2022-04-08 DIAGNOSIS — K22.70 BARRETT'S ESOPHAGUS WITHOUT DYSPLASIA: ICD-10-CM

## 2022-04-08 DIAGNOSIS — I10 ESSENTIAL HYPERTENSION: ICD-10-CM

## 2022-04-08 DIAGNOSIS — M05.79 RHEUMATOID ARTHRITIS INVOLVING MULTIPLE SITES WITH POSITIVE RHEUMATOID FACTOR (HCC): ICD-10-CM

## 2022-04-08 DIAGNOSIS — G47.30 SLEEP APNEA, UNSPECIFIED TYPE: ICD-10-CM

## 2022-04-08 DIAGNOSIS — E78.49 OTHER HYPERLIPIDEMIA: Primary | ICD-10-CM

## 2022-04-08 PROCEDURE — 3008F BODY MASS INDEX DOCD: CPT | Performed by: FAMILY MEDICINE

## 2022-04-08 PROCEDURE — 3725F SCREEN DEPRESSION PERFORMED: CPT | Performed by: FAMILY MEDICINE

## 2022-04-08 PROCEDURE — 1036F TOBACCO NON-USER: CPT | Performed by: FAMILY MEDICINE

## 2022-04-08 PROCEDURE — 3074F SYST BP LT 130 MM HG: CPT | Performed by: FAMILY MEDICINE

## 2022-04-08 PROCEDURE — 3079F DIAST BP 80-89 MM HG: CPT | Performed by: FAMILY MEDICINE

## 2022-04-08 PROCEDURE — 99214 OFFICE O/P EST MOD 30 MIN: CPT | Performed by: FAMILY MEDICINE

## 2022-04-08 RX ORDER — UPADACITINIB 15 MG/1
TABLET, EXTENDED RELEASE ORAL DAILY
COMMUNITY

## 2022-04-08 RX ORDER — FOLIC ACID 1 MG/1
1000 TABLET ORAL DAILY
COMMUNITY
Start: 2022-03-10

## 2022-04-08 NOTE — ASSESSMENT & PLAN NOTE
In the setting of recently diagnosed RA and sleep apnea  Referral to [facility] sleep medicine center provided  Continue to use CPAP  Testosterone level ordered, to be completed prior to next appointment  Will continue to monitor

## 2022-04-08 NOTE — ASSESSMENT & PLAN NOTE
Stable; +RF, +anti-CCP, recently diagnosed  Continue MTX 20 mg weekly and RInvoq as prescribed by rheumatology  Continue to follow with rheumatology  Will continue to monitor

## 2022-04-08 NOTE — PROGRESS NOTES
Assessment/Plan:    Hyperlipidemia  Component      Latest Ref Rng & Units 11/6/2020 6/29/2021 3/22/2022   Cholesterol      See Comment mg/dL 211 (H) 200 217 (H)   Triglycerides      See Comment mg/dL 116 152 (H) 151 (H)   HDL      >=40 mg/dL 41 35 (L) 33 (L)   LDL Calculated      0 - 100 mg/dL 147 (H) 135 (H) 154 (H)     Poorly controlled  Encouraged diet and lifestyle modifications  Recommended lipid lowering therapy, but patient is resistant  Will continue to monitor, will repeat prior to next appointment  Essential hypertension  Stable  Continue medications as previously prescribed  Home BP monitoring encouraged  Will continue to monitor  Call office with questions or concerns  Rheumatoid arthritis involving multiple sites with positive rheumatoid factor (HCC)  Stable; +RF, +anti-CCP, recently diagnosed  Continue MTX 20 mg weekly and RInvoq as prescribed by rheumatology  Continue to follow with rheumatology  Will continue to monitor  Bashir's esophagus  Continue omeprazole 20 mg daily and Carafate 1 g tablet 4 times daily PRN  Referral to GI provided for evaluation and EGD  Will continue to monitor  Gastroesophageal reflux disease  Stable  Continue omeprazole 20 mg daily  Continue to avoid food triggers  Will continue to monitor  Severe obstructive sleep apnea  Poorly controlled  Patient currently on full face mask and not tolerating it well  Continue to use CPAP  Referral to sleep medicine provided  Will continue to monitor  Chronic fatigue  In the setting of recently diagnosed RA and sleep apnea  Referral to new sleep medicine center provided  Continue to use CPAP  Testosterone level ordered, to be completed prior to next appointment  Will continue to monitor  Problem List Items Addressed This Visit        Digestive    Bashir's esophagus     Continue omeprazole 20 mg daily and Carafate 1 g tablet 4 times daily PRN  Referral to GI provided for evaluation and EGD   Will continue to monitor  Relevant Orders    Ambulatory Referral to Gastroenterology       Respiratory    Severe obstructive sleep apnea     Poorly controlled  Patient currently on full face mask and not tolerating it well  Continue to use CPAP  Referral to sleep medicine provided  Will continue to monitor  Cardiovascular and Mediastinum    Essential hypertension     Stable  Continue medications as previously prescribed  Home BP monitoring encouraged  Will continue to monitor  Call office with questions or concerns  Relevant Orders    CBC and differential    Comprehensive metabolic panel    Lipid Panel with Direct LDL reflex    TSH, 3rd generation with Free T4 reflex       Musculoskeletal and Integument    Rheumatoid arthritis involving multiple sites with positive rheumatoid factor (HCC)     Stable; +RF, +anti-CCP, recently diagnosed  Continue MTX 20 mg weekly and RInvoq as prescribed by rheumatology  Continue to follow with rheumatology  Will continue to monitor  Relevant Medications    Upadacitinib ER (Rinvoq) 15 MG TB24       Other    Hyperlipidemia - Primary     Component      Latest Ref Rng & Units 11/6/2020 6/29/2021 3/22/2022   Cholesterol      See Comment mg/dL 211 (H) 200 217 (H)   Triglycerides      See Comment mg/dL 116 152 (H) 151 (H)   HDL      >=40 mg/dL 41 35 (L) 33 (L)   LDL Calculated      0 - 100 mg/dL 147 (H) 135 (H) 154 (H)     Poorly controlled  Encouraged diet and lifestyle modifications  Recommended lipid lowering therapy, but patient is resistant  Will continue to monitor, will repeat prior to next appointment  Relevant Orders    CBC and differential    Comprehensive metabolic panel    Lipid Panel with Direct LDL reflex    TSH, 3rd generation with Free T4 reflex    Chronic fatigue     In the setting of recently diagnosed RA and sleep apnea  Referral to new sleep medicine center provided  Continue to use CPAP   Testosterone level ordered, to be completed prior to next appointment  Will continue to monitor  Relevant Orders    Testosterone, free, total      Other Visit Diagnoses     Sleep apnea, unspecified type        Relevant Orders    Ambulatory Referral to Sleep Medicine            Subjective:      Patient ID: Melany Allen is a 49 y o  male  Melany Allen is a 49 y o  male with a PMH significant for HTN, HLD, RA (+RF, +anti-CCP), HECTOR, and GERD/Bashir's esophagus presenting for follow up  Today, patient states he is doing "okay"  Patient states he has recently gained weight, which he has contributed to the large amount of prednisone he was on while being diagnosed with rheumatoid arthritis  Patient states he knows his cholesterol is elevated, but he is not willing to start another medication at this time  Patient states he is overwhelmed with the amount of medication he is taking and would like to try to reduce his cholesterol with diet and exercise  In addition, patient admits to fatigue  Patient states he used to be able to work until 11 PM at night without getting tired, but now he is experiencing extreme fatigue to the point that he has been falling asleep while at work/at 4 PM  Patient states he has been diagnosed with HECTOR through home sleep study, but never saw a doctor  Patient states he has had CPAP technicians over to his home to adjust settings, but he is not tolerating it well  Complains of the mask "floating" over his face and impeding his sleep  Patient is agreeable to seeing another sleep medicine specialist about this  Otherwise, patient is doing well and has no other complaints/concerns  The following portions of the patient's history were reviewed and updated as appropriate: allergies, current medications, past family history, past medical history, past social history, past surgical history and problem list     Review of Systems   Constitutional: Positive for fatigue  Negative for chills and fever     HENT: Negative for ear pain and sore throat  Eyes: Negative for pain and visual disturbance  Respiratory: Negative for cough, shortness of breath, wheezing and stridor  Cardiovascular: Negative for chest pain, palpitations and leg swelling  Gastrointestinal: Negative for abdominal pain, constipation, diarrhea, nausea and vomiting  Genitourinary: Negative for dysuria and hematuria  Musculoskeletal: Negative for arthralgias, back pain, gait problem and joint swelling  Skin: Negative for color change and rash  Neurological: Negative for dizziness, seizures, syncope, weakness, light-headedness and headaches  Psychiatric/Behavioral: Positive for sleep disturbance (secondary to HECTOR)  All other systems reviewed and are negative  Objective: Well appearing male in no acute distress  Alert and coherent  Good and reliable historian  /80 (BP Location: Left arm, Patient Position: Sitting, Cuff Size: Large)   Pulse 77   Temp 99 °F (37 2 °C) (Tympanic)   Resp 16   Ht 5' 9" (1 753 m)   Wt 107 kg (235 lb 12 8 oz)   SpO2 96%   BMI 34 82 kg/m²      Physical Exam  Vitals and nursing note reviewed  Constitutional:       General: He is not in acute distress  Appearance: Normal appearance  He is not ill-appearing, toxic-appearing or diaphoretic  HENT:      Head: Normocephalic and atraumatic  Right Ear: Tympanic membrane normal       Left Ear: Tympanic membrane normal    Eyes:      Extraocular Movements: Extraocular movements intact  Conjunctiva/sclera: Conjunctivae normal    Cardiovascular:      Rate and Rhythm: Normal rate and regular rhythm  Heart sounds: Normal heart sounds  Pulmonary:      Effort: Pulmonary effort is normal       Breath sounds: Normal breath sounds  Musculoskeletal:         General: Normal range of motion  Cervical back: Normal range of motion  Right lower leg: No edema  Left lower leg: No edema  Skin:     General: Skin is warm and dry     Neurological: General: No focal deficit present  Mental Status: He is alert  Mental status is at baseline     Psychiatric:         Mood and Affect: Mood normal

## 2022-04-08 NOTE — ASSESSMENT & PLAN NOTE
Component      Latest Ref Rng & Units 11/6/2020 6/29/2021 3/22/2022   Cholesterol      See Comment mg/dL 211 (H) 200 217 (H)   Triglycerides      See Comment mg/dL 116 152 (H) 151 (H)   HDL      >=40 mg/dL 41 35 (L) 33 (L)   LDL Calculated      0 - 100 mg/dL 147 (H) 135 (H) 154 (H)     Poorly controlled  Encouraged diet and lifestyle modifications  Recommended lipid lowering therapy, but patient is resistant  Will continue to monitor, will repeat prior to next appointment

## 2022-04-08 NOTE — ASSESSMENT & PLAN NOTE
Poorly controlled  Patient currently on full face mask and not tolerating it well  Continue to use CPAP  Referral to sleep medicine provided  Will continue to monitor

## 2022-04-08 NOTE — ASSESSMENT & PLAN NOTE
Stable  Continue medications as previously prescribed  Home BP monitoring encouraged  Will continue to monitor  Call office with questions or concerns

## 2022-04-08 NOTE — ASSESSMENT & PLAN NOTE
Continue omeprazole 20 mg daily and Carafate 1 g tablet 4 times daily PRN  Referral to GI provided for evaluation and EGD  Will continue to monitor

## 2022-04-19 DIAGNOSIS — I10 ESSENTIAL HYPERTENSION: ICD-10-CM

## 2022-04-19 RX ORDER — LOSARTAN POTASSIUM 100 MG/1
100 TABLET ORAL DAILY
Qty: 90 TABLET | Refills: 1 | Status: SHIPPED | OUTPATIENT
Start: 2022-04-19

## 2022-04-22 ENCOUNTER — TELEPHONE (OUTPATIENT)
Dept: GASTROENTEROLOGY | Facility: CLINIC | Age: 57
End: 2022-04-22

## 2022-04-22 ENCOUNTER — PREP FOR PROCEDURE (OUTPATIENT)
Dept: GASTROENTEROLOGY | Facility: CLINIC | Age: 57
End: 2022-04-22

## 2022-04-22 DIAGNOSIS — K21.9 GASTROESOPHAGEAL REFLUX DISEASE, UNSPECIFIED WHETHER ESOPHAGITIS PRESENT: Primary | ICD-10-CM

## 2022-04-22 NOTE — TELEPHONE ENCOUNTER
Dr Michelle Mclean, pt had a colonoscopy with you on  01 28 21  He has a referral in the system for an EGD  Please advise if pt is to come in for an ov, or if he can be scheduled for the EGD  Thanks! Fany ART Gastroenterology Surgery Coordinator  Cc: Thuy Wynn  I reached out to this patient to schedule an appointment and he would like to know if he can be scheduled for procedure, not consultation  Please let me know if he needs an appointment first and I will call him back

## 2022-04-22 NOTE — TELEPHONE ENCOUNTER
Can just schedule for egd due to history of noel's esophagus  Does not need office visit first unless he would like to be seen  I will place order

## 2022-04-26 NOTE — TELEPHONE ENCOUNTER
Scheduled date of EGD(as of today):  7/7/22  Physician performing EGD: Dr Yoel Woodward  [facility] of [facility]: [facility]  Instructions reviewed with patient by: Sergio Godinez - carlo instructions mailed to the patient  Clearances:  n/a

## 2022-05-23 ENCOUNTER — OFFICE VISIT (OUTPATIENT)
Dept: SLEEP CENTER | Facility: CLINIC | Age: 57
End: 2022-05-23
Payer: COMMERCIAL

## 2022-05-23 VITALS
HEART RATE: 85 BPM | DIASTOLIC BLOOD PRESSURE: 80 MMHG | SYSTOLIC BLOOD PRESSURE: 130 MMHG | WEIGHT: 234 LBS | BODY MASS INDEX: 34.66 KG/M2 | HEIGHT: 69 IN

## 2022-05-23 DIAGNOSIS — G47.30 SLEEP APNEA, UNSPECIFIED TYPE: ICD-10-CM

## 2022-05-23 DIAGNOSIS — G47.33 OSA (OBSTRUCTIVE SLEEP APNEA): Primary | ICD-10-CM

## 2022-05-23 PROCEDURE — 3075F SYST BP GE 130 - 139MM HG: CPT | Performed by: INTERNAL MEDICINE

## 2022-05-23 PROCEDURE — 1036F TOBACCO NON-USER: CPT | Performed by: INTERNAL MEDICINE

## 2022-05-23 PROCEDURE — 3079F DIAST BP 80-89 MM HG: CPT | Performed by: INTERNAL MEDICINE

## 2022-05-23 PROCEDURE — 99203 OFFICE O/P NEW LOW 30 MIN: CPT | Performed by: INTERNAL MEDICINE

## 2022-05-23 PROCEDURE — 3008F BODY MASS INDEX DOCD: CPT | Performed by: INTERNAL MEDICINE

## 2022-05-23 NOTE — PROGRESS NOTES
Sleep Consultation   Klaus Hay 49 y o  male MRN: [id number]      Reason for consultation: HECTOR management    Requesting physician: Dr Avtar Alamo    Assessment/Plan  49 y o  M with PMHx of RA, who comes in for evaluation of snoring and excessive daytime sleepiness  1   Severe HECTOR (AHI - 32) worse in the supine position (Supine AHI - 55) who is noncompliant with autoCPAP 6-14 cc of H2O pressure  Has Resmed S11  ([facility] - Shruthi Navarrete)  -  He would rip the mask off within 1 hr due to mask leak  His last use was 2 months ago  It seems as if he may have been using a large mask and I believe he would benefit from a medium size  Will have him do a mask fitting with [employer]  I would prefer and F30 interface to see if that is helpful to him  -  Follow compliance in 2-3 months to ensure that the new mask is working better       -  If he is still not tolerating it, I would consider switching to autoBIPAP instead        -  If the above fails, we did discuss inspire as a possible solution  He is very hesitant to go down that route  -  I also discussed in depth the risk of leaving sleep apnea untreated including hypertension, heart failure, arrhythmia, MI and stroke  History of Present Illness   HPI:  Klaus Hay is a 49 y o  male with PMHx as below who comes in for management of his HECTOR  He states that he was given a CPAP machine and has struggled with the device for 3 months  He found that within an hour he would be taking it off as it started leaking  He states that he had switched to a full face mask and does find that to work a bit better for him but he states that it will leak both at the top and bottom of his face  Once the mask starts leaking, he will take the CPAP off as he can no longer sleep    Patient notes 10lb weight gain and symptoms of difficulty falling asleep, difficulty staying asleep, snoring, excessive daytime sleepiness with an Martin score of 8, awakenings with gasping, witnessed apneas, morning headaches, bruxism and awakenings with dry mouth  he denies symptoms of restless legs  he denies symptoms of cataplexy, sleep paralysis, hypnopompic or hypnagogic hallucinations  Sleep History:  he goes to bed at approximately 11:30, will get to sleep in 10 min, will get out of bed at 6 am   he will get up 3-4 times at night due to apnea, snoring or being uncomfortable  It will then take a few minutes to fall back asleep    he does nap 2-3 times a week and does feel better after napping  Review of Systems  Genitourinary none   Cardiology none   Gastrointestinal none   Neurology none   Constitutional fatigue and weight change   Integumentary none   Psychiatry none   Musculoskeletal joint pain and back pain   Pulmonary snoring   ENT ringing in ears   Endocrine none   Hematological none            Historical Information   Past Medical History:   Diagnosis Date    GERD (gastroesophageal reflux disease)     Hypertension      Past Surgical History:   Procedure Laterality Date    ROTATOR CUFF REPAIR      SHOULDER SURGERY Left      Family History   Problem Relation Age of Onset    Diabetes Mother     Diabetes Brother      Social History     Socioeconomic History    Marital status: /Civil Union     Spouse name: Not on file    Number of children: Not on file    Years of education: Not on file    Highest education level: Not on file   Occupational History    Not on file   Tobacco Use    Smoking status: Never Smoker    Smokeless tobacco: Never Used   Vaping Use    Vaping Use: Never used   Substance and Sexual Activity    Alcohol use:  Yes    Drug use: Never    Sexual activity: Not on file   Other Topics Concern    Not on file   Social History Narrative    Not on file     Social Determinants of Health     Financial Resource Strain: Not on file   Food Insecurity: Not on file   Transportation Needs: Not on file   Physical Activity: Not on file   Stress: Not on file Social Connections: Not on file   Intimate Partner Violence: Not on file   Housing Stability: Not on file       Occupational History: maintanence    Meds/Allergies   Allergies   Allergen Reactions    Bee Venom Anaphylaxis    Latex Rash    Other Rash       Home medications:  Prior to Admission medications    Medication Sig Start Date End Date Taking? Authorizing Provider   amLODIPine (NORVASC) 10 mg tablet TAKE 1 TABLET BY MOUTH EVERY DAY 1/11/22  Yes Laurie Triana MD   folic acid (FOLVITE) 1 mg tablet Take 1,000 mcg by mouth daily 3/10/22  Yes Historical Provider, MD   ibuprofen (MOTRIN) 200 mg tablet Take 200 mg by mouth every 6 (six) hours as needed for mild pain   Yes Historical Provider, MD   losartan (COZAAR) 100 MG tablet Take 1 tablet (100 mg total) by mouth daily 4/19/22  Yes Laurie Triana MD   methotrexate 2 5 mg tablet methotrexate sodium 2 5 mg tablet   TAKE 8 TABLETS EVERY WEEK BY MOUTH FOR 30 DAYS  Yes Historical Provider, MD   omeprazole (PriLOSEC) 20 mg delayed release capsule Take 20 mg by mouth 4/24/19  Yes Historical Provider, MD   sucralfate (CARAFATE) 1 g tablet Take 1 g by mouth 4 (four) times a day as needed  12/27/16  Yes Historical Provider, MD   EPINEPHrine (EpiPen 2-Moo) 0 3 mg/0 3 mL SOAJ Inject 0 3 mL (0 3 mg total) into a muscle once for 1 dose  Patient not taking: Reported on 8/30/2021 5/7/21 5/7/21  Laurie Triana MD   ergocalciferol (VITAMIN D2) 50,000 units TAKE ONE CAPSULE BY MOUTH ONE TIME PER WEEK  Patient not taking: No sig reported 12/20/19   Laurie Triana MD   Na Sulfate-K Sulfate-Mg Sulf 17 5-3 13-1 6 GM/177ML SOLN Take as directed by the office    Patient not taking: No sig reported 1/18/21   Sakina Leroy PA-C   predniSONE 10 mg tablet  4/28/21   Historical Provider, MD   scopolamine (TRANSDERM-SCOP) 1 5 mg/3 days TD 72 hr patch Place 1 patch on the skin every third day 4/24/19 4/23/20  Historical Provider, MD   Upadacitinib ER (Rinvoq) 15 MG TB24 Daily  Patient not taking: No sig reported    Historical Provider, MD       Vitals:   Blood pressure 130/80, pulse 85, height 5' 9" (1 753 m), weight 106 kg (234 lb)  , RA, Body mass index is 34 56 kg/m²  Neck Circumference: 17    General: Pleasant, Awake alert and oriented x 3, conversant without conversational dyspnea, NAD, normal affect  HEENT:  PERRL, Sclera noninjected, nonicteric OU, Nares patent,  no craniofacial abnormalities, Mucous membranes, moist, no oral lesions, normal dentition, Mallampati class 4  NECK: Trachea midline, no accessory muscle use, no stridor, no cervical or supraclavicular adenopathy, JVP not elevated  CARDIAC: Reg, single s1/S2, no m/r/g  PULM: CTA bilaterally no wheezing, rhonchi or rales  ABD: Normoactive bowel sounds, soft nontender, nondistended, no rebound, no rigidity, no guarding  EXT: No cyanosis, no clubbing, no edema, normal capillary refill  NEURO: no focal neurologic deficits, AAOx3, moving all extremities appropriately    Labs: I have personally reviewed pertinent lab results  Lab Results   Component Value Date    WBC 6 40 03/22/2022    HGB 15 6 03/22/2022    HCT 45 2 03/22/2022    MCV 86 03/22/2022     03/22/2022      Lab Results   Component Value Date    GLUCOSE 96 12/16/2014    CALCIUM 9 0 03/22/2022     (L) 12/16/2014    K 4 0 03/22/2022    CO2 25 03/22/2022     03/22/2022    BUN 19 03/22/2022    CREATININE 1 10 03/22/2022       Sleep studies:  Home study [facility] May 18, 2021  Severe HECTOR with AHI of 32-mainly on supine position  Oxygen liza at 76%, 68 3 minutes below 88%      Compliance Data:  3/25/22 - 5/23/22                                  Type of CPAP:  autopAP 6-14                                   Percent usage: 5%                                   Average time used: 1 hr 15 minute                                   Leak - 4L - 42L                                   Residual AHI: 2 0                                       Nakul Montes De [facility] Sleep Physician

## 2022-05-23 NOTE — LETTER
May 23, 2022     Merrily Jeans, MD  [address] [place] [address]    Patient: Ijeoma Crespo   YOB: 1965   Date of Visit: 5/23/2022       Dear Dr Avery Stanley: Thank you for referring Ijeoma Crespo to me for evaluation  Below are my notes for this consultation  If you have questions, please do not hesitate to call me  I look forward to following your patient along with you  Sincerely,        Ladi Roth DO        CC: No Recipients  Ladi Roth DO  5/23/2022  5:01 PM  Sign when Signing Visit  Sleep Consultation   Ijeoma Crespo 49 y o  male MRN: [id number]      Reason for consultation: HECTOR management    Requesting physician: Dr Avery Stanley    Assessment/Plan  49 y o  M with PMHx of RA, who comes in for evaluation of snoring and excessive daytime sleepiness  1   Severe HECTOR (AHI - 32) worse in the supine position (Supine AHI - 55) who is noncompliant with autoCPAP 6-14 cc of H2O pressure  Has [employer]med S11  (DME - [facility])  -  He would rip the mask off within 1 hr due to mask leak  His last use was 2 months ago  It seems as if he may have been using a large mask and I believe he would benefit from a medium size  Will have him do a mask fitting with [employer]  I would prefer and F30 interface to see if that is helpful to him  -  Follow compliance in 2-3 months to ensure that the new mask is working better       -  If he is still not tolerating it, I would consider switching to autoBIPAP instead        -  If the above fails, we did discuss inspire as a possible solution  He is very hesitant to go down that route  -  I also discussed in depth the risk of leaving sleep apnea untreated including hypertension, heart failure, arrhythmia, MI and stroke  History of Present Illness   HPI:  Ijeoma Crespo is a 49 y o  male with PMHx as below who comes in for management of his HECTOR    He states that he was given a CPAP machine and has struggled with the device for 3 months  He found that within an hour he would be taking it off as it started leaking  He states that he had switched to a full face mask and does find that to work a bit better for him but he states that it will leak both at the top and bottom of his face  Once the mask starts leaking, he will take the CPAP off as he can no longer sleep  Patient notes 10lb weight gain and symptoms of difficulty falling asleep, difficulty staying asleep, snoring, excessive daytime sleepiness with an [place] score of 8, awakenings with gasping, witnessed apneas, morning headaches, bruxism and awakenings with dry mouth  he denies symptoms of restless legs  he denies symptoms of cataplexy, sleep paralysis, hypnopompic or hypnagogic hallucinations  Sleep History:  he goes to bed at approximately 11:30, will get to sleep in 10 min, will get out of bed at 6 am   he will get up 3-4 times at night due to apnea, snoring or being uncomfortable  It will then take a few minutes to fall back asleep    he does nap 2-3 times a week and does feel better after napping        Review of Systems  Genitourinary none   Cardiology none   Gastrointestinal none   Neurology none   Constitutional fatigue and weight change   Integumentary none   Psychiatry none   Musculoskeletal joint pain and back pain   Pulmonary snoring   ENT ringing in ears   Endocrine none   Hematological none            Historical Information   Past Medical History:   Diagnosis Date    GERD (gastroesophageal reflux disease)     Hypertension      Past Surgical History:   Procedure Laterality Date    ROTATOR CUFF REPAIR      SHOULDER SURGERY Left      Family History   Problem Relation Age of Onset    Diabetes Mother     Diabetes Brother      Social History     Socioeconomic History    Marital status: /Civil Union     Spouse name: Not on file    Number of children: Not on file    Years of education: Not on file    Highest education level: Not on file   Occupational History    Not on file   Tobacco Use    Smoking status: Never Smoker    Smokeless tobacco: Never Used   Vaping Use    Vaping Use: Never used   Substance and Sexual Activity    Alcohol use: Yes    Drug use: Never    Sexual activity: Not on file   Other Topics Concern    Not on file   Social History Narrative    Not on file     Social Determinants of Health     Financial Resource Strain: Not on file   Food Insecurity: Not on file   Transportation Needs: Not on file   Physical Activity: Not on file   Stress: Not on file   Social Connections: Not on file   Intimate Partner Violence: Not on file   Housing Stability: Not on file       Occupational History: maintanence    Meds/Allergies   Allergies   Allergen Reactions    Bee Venom Anaphylaxis    Latex Rash    Other Rash       Home medications:  Prior to Admission medications    Medication Sig Start Date End Date Taking? Authorizing Provider   amLODIPine (NORVASC) 10 mg tablet TAKE 1 TABLET BY MOUTH EVERY DAY 1/11/22  Yes Noelle Plunkett MD   folic acid (FOLVITE) 1 mg tablet Take 1,000 mcg by mouth daily 3/10/22  Yes Historical Provider, MD   ibuprofen (MOTRIN) 200 mg tablet Take 200 mg by mouth every 6 (six) hours as needed for mild pain   Yes Historical Provider, MD   losartan (COZAAR) 100 MG tablet Take 1 tablet (100 mg total) by mouth daily 4/19/22  Yes Noelle Plunkett MD   methotrexate 2 5 mg tablet methotrexate sodium 2 5 mg tablet   TAKE 8 TABLETS EVERY WEEK BY MOUTH FOR 30 DAYS     Yes Historical Provider, MD   omeprazole (PriLOSEC) 20 mg delayed release capsule Take 20 mg by mouth 4/24/19  Yes Historical Provider, MD   sucralfate (CARAFATE) 1 g tablet Take 1 g by mouth 4 (four) times a day as needed  12/27/16  Yes Historical Provider, MD   EPINEPHrine (EpiPen 2-Moo) 0 3 mg/0 3 mL SOAJ Inject 0 3 mL (0 3 mg total) into a muscle once for 1 dose  Patient not taking: Reported on 8/30/2021 5/7/21 5/7/21  Noelle Plunkett MD ergocalciferol (VITAMIN D2) 50,000 units TAKE ONE CAPSULE BY MOUTH ONE TIME PER WEEK  Patient not taking: No sig reported 12/20/19   Dondra Councilman, MD   Na Sulfate-K Sulfate-Mg Sulf 17 5-3 13-1 6 GM/177ML SOLN Take as directed by the office  Patient not taking: No sig reported 1/18/21   Kay Mckeon PA-C   predniSONE 10 mg tablet  4/28/21   Historical Provider, MD   scopolamine (TRANSDERM-SCOP) 1 5 mg/3 days TD 72 hr patch Place 1 patch on the skin every third day 4/24/19 4/23/20  Historical Provider, MD   Upadacitinib ER (Rinvoq) 15 MG TB24 Daily  Patient not taking: No sig reported    Historical Provider, MD       Vitals:   Blood pressure 130/80, pulse 85, height 5' 9" (1 753 m), weight 106 kg (234 lb)  , RA, Body mass index is 34 56 kg/m²  Neck Circumference: 17    General: Pleasant, Awake alert and oriented x 3, conversant without conversational dyspnea, NAD, normal affect  HEENT:  PERRL, Sclera noninjected, nonicteric OU, Nares patent,  no craniofacial abnormalities, Mucous membranes, moist, no oral lesions, normal dentition, Mallampati class 4  NECK: Trachea midline, no accessory muscle use, no stridor, no cervical or supraclavicular adenopathy, JVP not elevated  CARDIAC: Reg, single s1/S2, no m/r/g  PULM: CTA bilaterally no wheezing, rhonchi or rales  ABD: Normoactive bowel sounds, soft nontender, nondistended, no rebound, no rigidity, no guarding  EXT: No cyanosis, no clubbing, no edema, normal capillary refill  NEURO: no focal neurologic deficits, AAOx3, moving all extremities appropriately    Labs: I have personally reviewed pertinent lab results    Lab Results   Component Value Date    WBC 6 40 03/22/2022    HGB 15 6 03/22/2022    HCT 45 2 03/22/2022    MCV 86 03/22/2022     03/22/2022      Lab Results   Component Value Date    GLUCOSE 96 12/16/2014    CALCIUM 9 0 03/22/2022     (L) 12/16/2014    K 4 0 03/22/2022    CO2 25 03/22/2022     03/22/2022    BUN 19 03/22/2022 CREATININE 1 10 03/22/2022       Sleep studies:  Home study [facility] May 18, 2021  Severe HECTOR with AHI of 32-mainly on supine position  Oxygen liza at 76%, 68 3 minutes below 88%      Compliance Data:  3/25/22 - 5/23/22                                  Type of CPAP:  autopAP 6-14                                   Percent usage: 5%                                   Average time used: 1 hr 15 minute                                   Leak - 4L - 42L                                   Residual AHI: 2 0                                       DO Nishi Clemens's Sleep Physician

## 2022-05-23 NOTE — PROGRESS NOTES
Review of Systems      Genitourinary none   Cardiology none   Gastrointestinal none   Neurology none   Constitutional fatigue and weight change   Integumentary none   Psychiatry none   Musculoskeletal joint pain and back pain   Pulmonary snoring   ENT ringing in ears   Endocrine none   Hematological none

## 2022-05-24 ENCOUNTER — TELEPHONE (OUTPATIENT)
Dept: SLEEP CENTER | Facility: CLINIC | Age: 57
End: 2022-05-24

## 2022-05-24 LAB
DME PARACHUTE DELIVERY DATE REQUESTED: NORMAL
DME PARACHUTE DELIVERY DATE REQUESTED: NORMAL
DME PARACHUTE ITEM DESCRIPTION: NORMAL
DME PARACHUTE ORDER STATUS: NORMAL
DME PARACHUTE ORDER STATUS: NORMAL
DME PARACHUTE SUPPLIER NAME: NORMAL
DME PARACHUTE SUPPLIER NAME: NORMAL
DME PARACHUTE SUPPLIER PHONE: NORMAL
DME PARACHUTE SUPPLIER PHONE: NORMAL

## 2022-05-24 NOTE — TELEPHONE ENCOUNTER
RX for pressure change and resupply sent to [address] [place] [address] [facility]

## 2022-06-08 DIAGNOSIS — K22.70 BARRETT'S ESOPHAGUS WITHOUT DYSPLASIA: Primary | ICD-10-CM

## 2022-06-08 RX ORDER — PANTOPRAZOLE SODIUM 20 MG/1
20 TABLET, DELAYED RELEASE ORAL
Qty: 30 TABLET | Refills: 3 | Status: SHIPPED | OUTPATIENT
Start: 2022-06-08 | End: 2022-07-05

## 2022-06-08 NOTE — TELEPHONE ENCOUNTER
Pt would like to change his prilosec which he been using for 20 years and would like to change to Pantoprazole   Has used before and would like to start on again

## 2022-06-10 RX ORDER — SODIUM CHLORIDE 9 MG/ML
125 INJECTION, SOLUTION INTRAVENOUS CONTINUOUS
Status: CANCELLED | OUTPATIENT
Start: 2022-06-10

## 2022-06-13 ENCOUNTER — ANESTHESIA (OUTPATIENT)
Dept: ANESTHESIOLOGY | Facility: HOSPITAL | Age: 57
End: 2022-06-13

## 2022-06-13 ENCOUNTER — ANESTHESIA EVENT (OUTPATIENT)
Dept: ANESTHESIOLOGY | Facility: HOSPITAL | Age: 57
End: 2022-06-13

## 2022-06-13 NOTE — ANESTHESIA PREPROCEDURE EVALUATION
Procedure:  PRE-OP ONLY    Relevant Problems   ANESTHESIA (within normal limits)      CARDIO   (+) Essential hypertension   (+) Hyperlipidemia      ENDO (within normal limits)      GI/HEPATIC   (+) Gastroesophageal reflux disease      /RENAL   (+) Cyst of right kidney      GYN (within normal limits)      HEMATOLOGY (within normal limits)      MUSCULOSKELETAL   (+) Low back pain   (+) Rheumatoid arthritis involving multiple sites with positive rheumatoid factor (HCC)   (+) Thoracic spondylosis without myelopathy      NEURO/PSYCH (within normal limits)      PULMONARY   (+) Severe obstructive sleep apnea   (+) Viral URI             Anesthesia Plan  ASA Score- 2     Anesthesia Type- IV sedation with anesthesia with ASA Monitors  Additional Monitors:   Airway Plan:           Plan Factors-Exercise tolerance (METS): >4 METS  Chart reviewed  Patient summary reviewed  Patient is not a current smoker  Induction- intravenous  Postoperative Plan-     Informed Consent- Anesthetic plan and risks discussed with patient

## 2022-06-14 ENCOUNTER — ANESTHESIA (OUTPATIENT)
Dept: GASTROENTEROLOGY | Facility: MEDICAL CENTER | Age: 57
End: 2022-06-14

## 2022-06-14 ENCOUNTER — HOSPITAL ENCOUNTER (OUTPATIENT)
Dept: GASTROENTEROLOGY | Facility: MEDICAL CENTER | Age: 57
Setting detail: OUTPATIENT SURGERY
Discharge: HOME | End: 2022-06-14
Attending: INTERNAL MEDICINE | Admitting: INTERNAL MEDICINE
Payer: COMMERCIAL

## 2022-06-14 ENCOUNTER — ANESTHESIA EVENT (OUTPATIENT)
Dept: GASTROENTEROLOGY | Facility: MEDICAL CENTER | Age: 57
End: 2022-06-14

## 2022-06-14 VITALS
HEART RATE: 77 BPM | SYSTOLIC BLOOD PRESSURE: 128 MMHG | BODY MASS INDEX: 34.66 KG/M2 | HEIGHT: 69 IN | RESPIRATION RATE: 16 BRPM | OXYGEN SATURATION: 93 % | DIASTOLIC BLOOD PRESSURE: 83 MMHG | TEMPERATURE: 97.4 F | WEIGHT: 234 LBS

## 2022-06-14 DIAGNOSIS — K21.9 GASTROESOPHAGEAL REFLUX DISEASE, UNSPECIFIED WHETHER ESOPHAGITIS PRESENT: ICD-10-CM

## 2022-06-14 PROCEDURE — 88305 TISSUE EXAM BY PATHOLOGIST: CPT | Performed by: PATHOLOGY

## 2022-06-14 PROCEDURE — 43239 EGD BIOPSY SINGLE/MULTIPLE: CPT | Performed by: INTERNAL MEDICINE

## 2022-06-14 RX ORDER — PROPOFOL 10 MG/ML
INJECTION, EMULSION INTRAVENOUS AS NEEDED
Status: DISCONTINUED | OUTPATIENT
Start: 2022-06-14 | End: 2022-06-14

## 2022-06-14 RX ORDER — SODIUM CHLORIDE 9 MG/ML
125 INJECTION, SOLUTION INTRAVENOUS CONTINUOUS
Status: DISCONTINUED | OUTPATIENT
Start: 2022-06-14 | End: 2022-06-18 | Stop reason: HOSPADM

## 2022-06-14 RX ORDER — LIDOCAINE HYDROCHLORIDE 20 MG/ML
INJECTION, SOLUTION EPIDURAL; INFILTRATION; INTRACAUDAL; PERINEURAL AS NEEDED
Status: DISCONTINUED | OUTPATIENT
Start: 2022-06-14 | End: 2022-06-14

## 2022-06-14 RX ADMIN — LIDOCAINE HYDROCHLORIDE 100 MG: 20 INJECTION, SOLUTION EPIDURAL; INFILTRATION; INTRACAUDAL; PERINEURAL at 12:24

## 2022-06-14 RX ADMIN — PROPOFOL 150 MG: 10 INJECTION, EMULSION INTRAVENOUS at 12:24

## 2022-06-14 RX ADMIN — SODIUM CHLORIDE 125 ML/HR: 0.9 INJECTION, SOLUTION INTRAVENOUS at 11:44

## 2022-06-14 NOTE — ANESTHESIA POSTPROCEDURE EVALUATION
Post-Op Assessment Note    CV Status:  Stable    Pain management: adequate     Mental Status:  Alert and awake   Hydration Status:  Euvolemic   PONV Controlled:  Controlled   Airway Patency:  Patent      Post Op Vitals Reviewed: Yes      Staff: Anesthesiologist         No complications documented      BP      Temp     Pulse     Resp      SpO2      /83   Pulse 77   Temp (!) 97 4 °F (36 3 °C)   Resp 16   Ht 5' 9" (1 753 m)   Wt 106 kg (234 lb)   SpO2 93%   BMI 34 56 kg/m²

## 2022-06-14 NOTE — ANESTHESIA PREPROCEDURE EVALUATION
Procedure:  EGD    Relevant Problems   ANESTHESIA (within normal limits)      CARDIO   (+) Essential hypertension   (+) Hyperlipidemia      ENDO (within normal limits)      GI/HEPATIC   (+) Gastroesophageal reflux disease      /RENAL   (+) Cyst of right kidney      GYN (within normal limits)      HEMATOLOGY (within normal limits)      MUSCULOSKELETAL   (+) Low back pain   (+) Rheumatoid arthritis involving multiple sites with positive rheumatoid factor (HCC)   (+) Thoracic spondylosis without myelopathy      NEURO/PSYCH (within normal limits)      PULMONARY   (+) Severe obstructive sleep apnea   (+) Viral URI        Physical Exam    Airway    Mallampati score: III  TM Distance: >3 FB  Neck ROM: full     Dental   No notable dental hx     Cardiovascular  Rhythm: regular, Rate: normal, Cardiovascular exam normal    Pulmonary  Pulmonary exam normal Breath sounds clear to auscultation,     Other Findings        Anesthesia Plan  ASA Score- 2     Anesthesia Type- IV sedation with anesthesia with ASA Monitors  Additional Monitors:   Airway Plan:           Plan Factors-Exercise tolerance (METS): >4 METS  Chart reviewed  Patient summary reviewed  Patient is not a current smoker  Induction- intravenous  Postoperative Plan-     Informed Consent- Anesthetic plan and risks discussed with patient

## 2022-06-14 NOTE — H&P
History and Physical - [facility]  Maureen Fajardo 49 y o  male MRN: [id number]      HPI: Maureen Fajardo is a 49y o  year old male who presents for EGD due to history of Bashir's esophagus  REVIEW OF SYSTEMS: Per the HPI, and otherwise unremarkable  Historical Information   Past Medical History:   Diagnosis Date    GERD (gastroesophageal reflux disease)     Hypertension      Past Surgical History:   Procedure Laterality Date    ROTATOR CUFF REPAIR      SHOULDER SURGERY Left      Social History   Social History     Substance and Sexual Activity   Alcohol Use Yes     Social History     Substance and Sexual Activity   Drug Use Never     Social History     Tobacco Use   Smoking Status Never Smoker   Smokeless Tobacco Never Used     Family History   Problem Relation Age of Onset    Diabetes Mother     Diabetes Brother        Meds/Allergies       Current Outpatient Medications:     amLODIPine (NORVASC) 10 mg tablet    EPINEPHrine (EpiPen 2-Moo) 0 3 mg/0 3 mL SOAJ    ergocalciferol (VITAMIN D2) 08,228 units    folic acid (FOLVITE) 1 mg tablet    ibuprofen (MOTRIN) 200 mg tablet    losartan (COZAAR) 100 MG tablet    methotrexate 2 5 mg tablet    Na Sulfate-K Sulfate-Mg Sulf 17 5-3 13-1 6 GM/177ML SOLN    omeprazole (PriLOSEC) 20 mg delayed release capsule    pantoprazole (PROTONIX) 20 mg tablet    predniSONE 10 mg tablet    scopolamine (TRANSDERM-SCOP) 1 5 mg/3 days TD 72 hr patch    sucralfate (CARAFATE) 1 g tablet    Upadacitinib ER (Rinvoq) 15 MG TB24    Allergies   Allergen Reactions    Bee Venom Anaphylaxis    Latex Rash    Other Rash       Objective     There were no vitals taken for this visit  PHYSICAL EXAM    Gen: NAD  Head: NCAT  CV: RRR  CHEST: Clear  ABD: soft, NT/ND  EXT: no edema      ASSESSMENT/PLAN:  This is a 49y o  year old male here for EGD, and he is stable and optimized for his procedure

## 2022-07-04 DIAGNOSIS — K22.70 BARRETT'S ESOPHAGUS WITHOUT DYSPLASIA: ICD-10-CM

## 2022-07-05 RX ORDER — PANTOPRAZOLE SODIUM 20 MG/1
TABLET, DELAYED RELEASE ORAL
Qty: 90 TABLET | Refills: 1 | Status: SHIPPED | OUTPATIENT
Start: 2022-07-05 | End: 2022-08-15 | Stop reason: SDUPTHER

## 2022-07-11 ENCOUNTER — TELEPHONE (OUTPATIENT)
Dept: GASTROENTEROLOGY | Facility: CLINIC | Age: 57
End: 2022-07-11

## 2022-07-11 NOTE — TELEPHONE ENCOUNTER
I spoke to Zain Sloan about biopsy results  He will continue Protonix 20 mg daily 30 minutes before breakfast     Please enter reminder for repeat EGD in 3 years  Thank you!

## 2022-07-11 NOTE — TELEPHONE ENCOUNTER
I tried to call patient it did not go through, there is no cell phone listed  Will someone please try to call him and let him know that biopsy showed Bashir's esophagus  He should be on at least once daily PPI and we should repeat the EGD in 3 years  It is a very small area but we should watch this as he ages      GI staff please place repeat EGD in 3 years time

## 2022-07-13 DIAGNOSIS — I10 ESSENTIAL HYPERTENSION: ICD-10-CM

## 2022-07-13 RX ORDER — AMLODIPINE BESYLATE 10 MG/1
TABLET ORAL
Qty: 90 TABLET | Refills: 1 | Status: SHIPPED | OUTPATIENT
Start: 2022-07-13

## 2022-07-13 NOTE — TELEPHONE ENCOUNTER
Pharmacy requesting refill on amlodipine  Last seen 4/8/22  Has appt 10/10/22  Medication sent to pharmacy

## 2022-08-15 DIAGNOSIS — K22.70 BARRETT'S ESOPHAGUS WITHOUT DYSPLASIA: ICD-10-CM

## 2022-08-15 NOTE — TELEPHONE ENCOUNTER
PT prilosec was no longer working so we prescribed the pantoprazole which has him almost near perfect but thinks she needs an additonal pill at night or an increased dosage because still some foods give him bad acid reflux  Not sure if we can up dosage or if he just needs to be seen  Please call pt to advise

## 2022-08-16 ENCOUNTER — APPOINTMENT (OUTPATIENT)
Dept: LAB | Age: 57
End: 2022-08-16
Payer: COMMERCIAL

## 2022-08-16 DIAGNOSIS — M06.89 OTHER SPECIFIED RHEUMATOID ARTHRITIS, MULTIPLE SITES (HCC): ICD-10-CM

## 2022-08-16 DIAGNOSIS — Z79.899 ENCOUNTER FOR LONG-TERM (CURRENT) USE OF OTHER MEDICATIONS: ICD-10-CM

## 2022-08-16 LAB
ALBUMIN SERPL BCP-MCNC: 3.4 G/DL (ref 3.5–5)
ALP SERPL-CCNC: 94 U/L (ref 46–116)
ALT SERPL W P-5'-P-CCNC: 48 U/L (ref 12–78)
ANION GAP SERPL CALCULATED.3IONS-SCNC: 4 MMOL/L (ref 4–13)
AST SERPL W P-5'-P-CCNC: 25 U/L (ref 5–45)
BASOPHILS # BLD AUTO: 0.05 THOUSANDS/ΜL (ref 0–0.1)
BASOPHILS NFR BLD AUTO: 1 % (ref 0–1)
BILIRUB SERPL-MCNC: 0.67 MG/DL (ref 0.2–1)
BUN SERPL-MCNC: 17 MG/DL (ref 5–25)
CALCIUM ALBUM COR SERPL-MCNC: 9.6 MG/DL (ref 8.3–10.1)
CALCIUM SERPL-MCNC: 9.1 MG/DL (ref 8.3–10.1)
CHLORIDE SERPL-SCNC: 107 MMOL/L (ref 96–108)
CO2 SERPL-SCNC: 25 MMOL/L (ref 21–32)
CREAT SERPL-MCNC: 1.14 MG/DL (ref 0.6–1.3)
CRP SERPL QL: 8.5 MG/L
EOSINOPHIL # BLD AUTO: 0.23 THOUSAND/ΜL (ref 0–0.61)
EOSINOPHIL NFR BLD AUTO: 3 % (ref 0–6)
ERYTHROCYTE [DISTWIDTH] IN BLOOD BY AUTOMATED COUNT: 13.5 % (ref 11.6–15.1)
ERYTHROCYTE [SEDIMENTATION RATE] IN BLOOD: 30 MM/HOUR (ref 0–19)
GFR SERPL CREATININE-BSD FRML MDRD: 72 ML/MIN/1.73SQ M
GLUCOSE SERPL-MCNC: 155 MG/DL (ref 65–140)
HCT VFR BLD AUTO: 45 % (ref 36.5–49.3)
HGB BLD-MCNC: 15.5 G/DL (ref 12–17)
IMM GRANULOCYTES # BLD AUTO: 0.05 THOUSAND/UL (ref 0–0.2)
IMM GRANULOCYTES NFR BLD AUTO: 1 % (ref 0–2)
LYMPHOCYTES # BLD AUTO: 2.13 THOUSANDS/ΜL (ref 0.6–4.47)
LYMPHOCYTES NFR BLD AUTO: 27 % (ref 14–44)
MCH RBC QN AUTO: 30.3 PG (ref 26.8–34.3)
MCHC RBC AUTO-ENTMCNC: 34.4 G/DL (ref 31.4–37.4)
MCV RBC AUTO: 88 FL (ref 82–98)
MONOCYTES # BLD AUTO: 0.57 THOUSAND/ΜL (ref 0.17–1.22)
MONOCYTES NFR BLD AUTO: 7 % (ref 4–12)
NEUTROPHILS # BLD AUTO: 4.73 THOUSANDS/ΜL (ref 1.85–7.62)
NEUTS SEG NFR BLD AUTO: 61 % (ref 43–75)
NRBC BLD AUTO-RTO: 0 /100 WBCS
PLATELET # BLD AUTO: 323 THOUSANDS/UL (ref 149–390)
PMV BLD AUTO: 9.3 FL (ref 8.9–12.7)
POTASSIUM SERPL-SCNC: 4 MMOL/L (ref 3.5–5.3)
PROT SERPL-MCNC: 7.3 G/DL (ref 6.4–8.4)
RBC # BLD AUTO: 5.11 MILLION/UL (ref 3.88–5.62)
SODIUM SERPL-SCNC: 136 MMOL/L (ref 135–147)
WBC # BLD AUTO: 7.76 THOUSAND/UL (ref 4.31–10.16)

## 2022-08-16 PROCEDURE — 86140 C-REACTIVE PROTEIN: CPT

## 2022-08-16 PROCEDURE — 85652 RBC SED RATE AUTOMATED: CPT

## 2022-08-16 PROCEDURE — 80053 COMPREHEN METABOLIC PANEL: CPT

## 2022-08-16 PROCEDURE — 36415 COLL VENOUS BLD VENIPUNCTURE: CPT

## 2022-08-16 PROCEDURE — 85025 COMPLETE CBC W/AUTO DIFF WBC: CPT

## 2022-08-16 RX ORDER — PANTOPRAZOLE SODIUM 40 MG/1
40 TABLET, DELAYED RELEASE ORAL
Qty: 90 TABLET | Refills: 1 | Status: SHIPPED | OUTPATIENT
Start: 2022-08-16

## 2022-10-28 DIAGNOSIS — I10 ESSENTIAL HYPERTENSION: ICD-10-CM

## 2022-10-28 RX ORDER — LOSARTAN POTASSIUM 100 MG/1
TABLET ORAL
Qty: 30 TABLET | Refills: 0 | Status: SHIPPED | OUTPATIENT
Start: 2022-10-28

## 2022-11-11 DIAGNOSIS — I10 ESSENTIAL HYPERTENSION: ICD-10-CM

## 2022-11-11 RX ORDER — LOSARTAN POTASSIUM 100 MG/1
TABLET ORAL
Qty: 90 TABLET | Refills: 0 | Status: SHIPPED | OUTPATIENT
Start: 2022-11-11

## 2022-12-05 ENCOUNTER — APPOINTMENT (OUTPATIENT)
Dept: LAB | Age: 57
End: 2022-12-05

## 2022-12-05 DIAGNOSIS — I10 ESSENTIAL HYPERTENSION: ICD-10-CM

## 2022-12-05 DIAGNOSIS — M06.89 OTHER SPECIFIED RHEUMATOID ARTHRITIS, MULTIPLE SITES (HCC): ICD-10-CM

## 2022-12-05 DIAGNOSIS — Z79.899 ENCOUNTER FOR LONG-TERM (CURRENT) USE OF OTHER MEDICATIONS: ICD-10-CM

## 2022-12-05 DIAGNOSIS — E78.49 OTHER HYPERLIPIDEMIA: ICD-10-CM

## 2022-12-05 LAB
ALBUMIN SERPL BCP-MCNC: 3.7 G/DL (ref 3.5–5)
ALP SERPL-CCNC: 86 U/L (ref 46–116)
ALT SERPL W P-5'-P-CCNC: 42 U/L (ref 12–78)
ANION GAP SERPL CALCULATED.3IONS-SCNC: 4 MMOL/L (ref 4–13)
AST SERPL W P-5'-P-CCNC: 21 U/L (ref 5–45)
BASOPHILS # BLD AUTO: 0.07 THOUSANDS/ÂΜL (ref 0–0.1)
BASOPHILS NFR BLD AUTO: 1 % (ref 0–1)
BILIRUB SERPL-MCNC: 0.75 MG/DL (ref 0.2–1)
BUN SERPL-MCNC: 22 MG/DL (ref 5–25)
CALCIUM SERPL-MCNC: 9.4 MG/DL (ref 8.3–10.1)
CHLORIDE SERPL-SCNC: 109 MMOL/L (ref 96–108)
CO2 SERPL-SCNC: 23 MMOL/L (ref 21–32)
CREAT SERPL-MCNC: 1.07 MG/DL (ref 0.6–1.3)
CRP SERPL QL: 5 MG/L
EOSINOPHIL # BLD AUTO: 0.21 THOUSAND/ÂΜL (ref 0–0.61)
EOSINOPHIL NFR BLD AUTO: 3 % (ref 0–6)
ERYTHROCYTE [DISTWIDTH] IN BLOOD BY AUTOMATED COUNT: 12.7 % (ref 11.6–15.1)
ERYTHROCYTE [SEDIMENTATION RATE] IN BLOOD: 18 MM/HOUR (ref 0–19)
GFR SERPL CREATININE-BSD FRML MDRD: 77 ML/MIN/1.73SQ M
GLUCOSE P FAST SERPL-MCNC: 101 MG/DL (ref 65–99)
HCT VFR BLD AUTO: 48.3 % (ref 36.5–49.3)
HGB BLD-MCNC: 15.9 G/DL (ref 12–17)
IMM GRANULOCYTES # BLD AUTO: 0.03 THOUSAND/UL (ref 0–0.2)
IMM GRANULOCYTES NFR BLD AUTO: 0 % (ref 0–2)
LYMPHOCYTES # BLD AUTO: 2.06 THOUSANDS/ÂΜL (ref 0.6–4.47)
LYMPHOCYTES NFR BLD AUTO: 26 % (ref 14–44)
MCH RBC QN AUTO: 29.2 PG (ref 26.8–34.3)
MCHC RBC AUTO-ENTMCNC: 32.9 G/DL (ref 31.4–37.4)
MCV RBC AUTO: 89 FL (ref 82–98)
MONOCYTES # BLD AUTO: 0.75 THOUSAND/ÂΜL (ref 0.17–1.22)
MONOCYTES NFR BLD AUTO: 10 % (ref 4–12)
NEUTROPHILS # BLD AUTO: 4.68 THOUSANDS/ÂΜL (ref 1.85–7.62)
NEUTS SEG NFR BLD AUTO: 60 % (ref 43–75)
NRBC BLD AUTO-RTO: 0 /100 WBCS
PLATELET # BLD AUTO: 310 THOUSANDS/UL (ref 149–390)
PMV BLD AUTO: 9.4 FL (ref 8.9–12.7)
POTASSIUM SERPL-SCNC: 4.7 MMOL/L (ref 3.5–5.3)
PROT SERPL-MCNC: 7.8 G/DL (ref 6.4–8.4)
RBC # BLD AUTO: 5.44 MILLION/UL (ref 3.88–5.62)
SODIUM SERPL-SCNC: 136 MMOL/L (ref 135–147)
WBC # BLD AUTO: 7.8 THOUSAND/UL (ref 4.31–10.16)

## 2022-12-07 ENCOUNTER — TELEPHONE (OUTPATIENT)
Dept: FAMILY MEDICINE CLINIC | Facility: CLINIC | Age: 57
End: 2022-12-07

## 2022-12-07 NOTE — TELEPHONE ENCOUNTER
----- Message from Padmini Snyder MD sent at 12/7/2022  8:03 AM EST -----  Blood work showed slightly elevated fasting glucose  His kidney, liver and blood count came back normal   He is due for an appointment

## 2022-12-08 LAB — MISCELLANEOUS LAB TEST RESULT: NORMAL

## 2023-01-08 DIAGNOSIS — I10 ESSENTIAL HYPERTENSION: ICD-10-CM

## 2023-01-09 RX ORDER — AMLODIPINE BESYLATE 10 MG/1
TABLET ORAL
Qty: 90 TABLET | Refills: 1 | Status: SHIPPED | OUTPATIENT
Start: 2023-01-09

## 2023-01-09 NOTE — TELEPHONE ENCOUNTER
Pharmacy requesting refill on amlodipine   Last seen 4/8/22  Has appt 1/11/23  Medication sent to pharmacy

## 2023-01-18 ENCOUNTER — RA CDI HCC (OUTPATIENT)
Dept: OTHER | Facility: HOSPITAL | Age: 58
End: 2023-01-18

## 2023-01-25 ENCOUNTER — OFFICE VISIT (OUTPATIENT)
Dept: FAMILY MEDICINE CLINIC | Facility: CLINIC | Age: 58
End: 2023-01-25

## 2023-01-25 VITALS
DIASTOLIC BLOOD PRESSURE: 86 MMHG | HEIGHT: 69 IN | WEIGHT: 235 LBS | OXYGEN SATURATION: 98 % | SYSTOLIC BLOOD PRESSURE: 132 MMHG | TEMPERATURE: 98.7 F | BODY MASS INDEX: 34.8 KG/M2 | HEART RATE: 79 BPM

## 2023-01-25 DIAGNOSIS — Z12.5 PROSTATE CANCER SCREENING: ICD-10-CM

## 2023-01-25 DIAGNOSIS — E66.9 OBESITY (BMI 30.0-34.9): ICD-10-CM

## 2023-01-25 DIAGNOSIS — R73.9 HYPERGLYCEMIA: ICD-10-CM

## 2023-01-25 DIAGNOSIS — M05.79 RHEUMATOID ARTHRITIS INVOLVING MULTIPLE SITES WITH POSITIVE RHEUMATOID FACTOR (HCC): ICD-10-CM

## 2023-01-25 DIAGNOSIS — I10 ESSENTIAL HYPERTENSION: Primary | ICD-10-CM

## 2023-01-25 DIAGNOSIS — Z00.01 ABNORMAL WELLNESS EXAM: ICD-10-CM

## 2023-01-25 DIAGNOSIS — K22.70 BARRETT'S ESOPHAGUS WITHOUT DYSPLASIA: ICD-10-CM

## 2023-01-25 DIAGNOSIS — T75.3XXS MOTION SICKNESS, SEQUELA: ICD-10-CM

## 2023-01-25 RX ORDER — LOSARTAN POTASSIUM 100 MG/1
100 TABLET ORAL DAILY
Qty: 90 TABLET | Refills: 1 | Status: SHIPPED | OUTPATIENT
Start: 2023-01-25

## 2023-01-25 RX ORDER — CLOBETASOL PROPIONATE 0.5 MG/G
CREAM TOPICAL
COMMUNITY

## 2023-01-25 RX ORDER — PANTOPRAZOLE SODIUM 40 MG/1
40 TABLET, DELAYED RELEASE ORAL
Qty: 90 TABLET | Refills: 1 | Status: CANCELLED | OUTPATIENT
Start: 2023-01-25

## 2023-01-25 RX ORDER — LEFLUNOMIDE 10 MG/1
TABLET ORAL
COMMUNITY

## 2023-01-25 RX ORDER — SCOLOPAMINE TRANSDERMAL SYSTEM 1 MG/1
1 PATCH, EXTENDED RELEASE TRANSDERMAL
Qty: 10 PATCH | Refills: 11 | Status: SHIPPED | OUTPATIENT
Start: 2023-01-25 | End: 2024-01-25

## 2023-01-25 RX ORDER — AMLODIPINE BESYLATE 10 MG/1
10 TABLET ORAL DAILY
Qty: 90 TABLET | Refills: 1 | Status: SHIPPED | OUTPATIENT
Start: 2023-01-25

## 2023-01-25 RX ORDER — DEXLANSOPRAZOLE 60 MG/1
60 CAPSULE, DELAYED RELEASE ORAL DAILY
Qty: 90 CAPSULE | Refills: 1 | Status: SHIPPED | OUTPATIENT
Start: 2023-01-25 | End: 2023-01-26 | Stop reason: CLARIF

## 2023-01-25 NOTE — PROGRESS NOTES
Name: Boy Barney      : 1965      MRN: [id number]  Encounter Provider: Melly Son MD  Encounter Date: 2023   Encounter department: [address] [place] [address]  Essential hypertension  Assessment & Plan:    Well controlled  Continue on losartan and amlodipine  Will continue to monitor  Orders:  -     amLODIPine (NORVASC) 10 mg tablet; Take 1 tablet (10 mg total) by mouth daily  -     losartan (COZAAR) 100 MG tablet; Take 1 tablet (100 mg total) by mouth daily  -     TSH, 3rd generation with Free T4 reflex; Future  -     Lipid Panel with Direct LDL reflex; Future    2  Bashir's esophagus without dysplasia  Assessment & Plan:    I am going to switch him to [address] [place] [address]  It was discussed about possible side effects  Continue follow-up with GI       3  Hyperglycemia  Assessment & Plan: It was discussed about low carb diet and regular exercise  Continue to monitor fasting glucose and A1c  Orders:  -     Hemoglobin A1C; Future    4  Prostate cancer screening  -     PSA, Total Screen; Future    5  Motion sickness, sequela  Assessment & Plan:    I was given prescription for scopolamine patches  It was discussed about possible side effects  Orders:  -     scopolamine (TRANSDERM-SCOP) 1 mg/3 days TD 72 hr patch; Place 1 patch on the skin over 72 hours every third day    6  Rheumatoid arthritis involving multiple sites with positive rheumatoid factor ([facility] [address] )  Assessment & Plan:    Fair controlled  Continue same medications and continue follow-up with Rheumatology  7  Abnormal wellness exam  Assessment & Plan: It was discussed about immunizations, diet, exercise and safety measures  8  Obesity (BMI 30 0-34  9)           Subjective       He is here today for follow-up multiple medical problems  He has been taking his medications  He denies any side effects from his medications    He is requesting scopolamine patches to use when he get on his boat for [place]  His GERD symptoms are not  very well controlled on pantoprazole  He has history of Bashir esophagus  He follow-up with GI  Review of Systems   Constitutional: Negative for chills and fever  HENT: Negative for trouble swallowing  Eyes: Negative for visual disturbance  Respiratory: Negative for cough and shortness of breath  Cardiovascular: Negative for chest pain and palpitations  Gastrointestinal: Negative for abdominal pain, blood in stool and vomiting  Endocrine: Negative for cold intolerance and heat intolerance  Genitourinary: Negative for difficulty urinating and dysuria  Musculoskeletal: Negative for gait problem  Skin: Negative for rash  Neurological: Negative for dizziness, syncope and headaches  Hematological: Negative for adenopathy  Psychiatric/Behavioral: Negative for behavioral problems  Past Medical History:   Diagnosis Date   • GERD (gastroesophageal reflux disease)    • Hypertension      Past Surgical History:   Procedure Laterality Date   • ROTATOR CUFF REPAIR     • SHOULDER SURGERY Left      Family History   Problem Relation Age of Onset   • Diabetes Mother    • Diabetes Brother      Social History     Socioeconomic History   • Marital status: /Civil Union     Spouse name: None   • Number of children: None   • Years of education: None   • Highest education level: None   Occupational History   • None   Tobacco Use   • Smoking status: Never   • Smokeless tobacco: Never   Vaping Use   • Vaping Use: Never used   Substance and Sexual Activity   • Alcohol use:  Yes   • Drug use: Never   • Sexual activity: None   Other Topics Concern   • None   Social History Narrative   • None     Social Determinants of Health     Financial Resource Strain: Not on file   Food Insecurity: Not on file   Transportation Needs: Not on file   Physical Activity: Not on file   Stress: Not on file   Social Connections: Not on file   Intimate Partner Violence: Not on file   Housing Stability: Not on file     Current Outpatient Medications on File Prior to Visit   Medication Sig   • clobetasol (TEMOVATE) 0 05 % cream clobetasol 0 05 % topical cream   APPLY TO AFFECTED AREA TWICE A DAY   • folic acid (FOLVITE) 1 mg tablet Take 1,000 mcg by mouth daily   • leflunomide (ARAVA) 10 MG tablet leflunomide 10 mg tablet   TAKE 1 TABLET BY MOUTH EVERY DAY FOR 30 DAYS   • sucralfate (CARAFATE) 1 g tablet Take 1 g by mouth 4 (four) times a day as needed    • EPINEPHrine (EpiPen 2-Moo) 0 3 mg/0 3 mL SOAJ Inject 0 3 mL (0 3 mg total) into a muscle once for 1 dose (Patient not taking: Reported on 8/30/2021)   • ergocalciferol (VITAMIN D2) 50,000 units TAKE ONE CAPSULE BY MOUTH ONE TIME PER WEEK (Patient not taking: No sig reported)   • Na Sulfate-K Sulfate-Mg Sulf 17 5-3 13-1 6 GM/177ML SOLN Take as directed by the office  (Patient not taking: Reported on 4/8/2022)   • predniSONE 10 mg tablet  (Patient not taking: Reported on 8/30/2021)     Allergies   Allergen Reactions   • Bee Venom Anaphylaxis   • Latex Rash   • Other Rash     Immunization History   Administered Date(s) Administered   • INFLUENZA 11/16/2020   • Influenza, injectable, quadrivalent, preservative free 0 5 mL 11/16/2020   • Tdap 05/11/2018       Objective     /86 (BP Location: Left arm, Patient Position: Sitting, Cuff Size: Large)   Pulse 79   Temp 98 7 °F (37 1 °C) (Tympanic)   Ht 5' 9" (1 753 m)   Wt 107 kg (235 lb)   SpO2 98%   BMI 34 70 kg/m²     Physical Exam  Vitals and nursing note reviewed  Constitutional:       Appearance: He is well-developed  HENT:      Head: Normocephalic and atraumatic  Eyes:      Pupils: Pupils are equal, round, and reactive to light  Cardiovascular:      Rate and Rhythm: Normal rate and regular rhythm  Heart sounds: Normal heart sounds  Pulmonary:      Effort: Pulmonary effort is normal       Breath sounds: Normal breath sounds     Abdominal:      General: Bowel sounds are normal       Palpations: Abdomen is soft  Musculoskeletal:         General: Normal range of motion  Cervical back: Normal range of motion and neck supple  Lymphadenopathy:      Cervical: No cervical adenopathy  Skin:     General: Skin is warm  Findings: No rash  Neurological:      Mental Status: He is alert and oriented to person, place, and time  Cranial Nerves: No cranial nerve deficit         Aimee Zheng MD

## 2023-01-26 RX ORDER — OMEPRAZOLE 20 MG/1
CAPSULE, DELAYED RELEASE ORAL
Refills: 0 | OUTPATIENT
Start: 2023-01-26

## 2023-01-26 RX ORDER — ESOMEPRAZOLE MAGNESIUM 40 MG/1
40 CAPSULE, DELAYED RELEASE ORAL
Qty: 30 CAPSULE | Refills: 5 | Status: SHIPPED | OUTPATIENT
Start: 2023-01-26

## 2023-01-26 NOTE — TELEPHONE ENCOUNTER
Dexilant not covered  Pt has tried omeprazole and pantoprazole  Discussed with [facility] CRNP, change to Nexium 40mg   rx sent to pharmacy

## 2023-01-28 DIAGNOSIS — K22.70 BARRETT'S ESOPHAGUS WITHOUT DYSPLASIA: ICD-10-CM

## 2023-01-30 RX ORDER — PANTOPRAZOLE SODIUM 40 MG/1
TABLET, DELAYED RELEASE ORAL
Qty: 30 TABLET | Refills: 5 | Status: SHIPPED | OUTPATIENT
Start: 2023-01-30

## 2023-02-02 PROBLEM — Z12.5 PROSTATE CANCER SCREENING: Status: ACTIVE | Noted: 2023-02-02

## 2023-02-02 PROBLEM — Z00.01 ABNORMAL WELLNESS EXAM: Status: ACTIVE | Noted: 2023-02-02

## 2023-02-02 PROBLEM — R73.9 HYPERGLYCEMIA: Status: ACTIVE | Noted: 2023-02-02

## 2023-02-02 PROBLEM — T75.3XXA MOTION SICKNESS: Status: ACTIVE | Noted: 2023-02-02

## 2023-02-02 NOTE — ASSESSMENT & PLAN NOTE
I am going to switch him to [address] [place]  It was discussed about possible side effects    Continue follow-up with GI

## 2023-02-02 NOTE — PROGRESS NOTES
Name: Tripp Gray      : 1965      MRN: [id number]  Encounter Provider: Sudha Hennessy MD  Encounter Date: 2023   Encounter department: [address] [place] [address]  Essential hypertension  Assessment & Plan:    Well controlled  Continue on losartan and amlodipine  Will continue to monitor  Orders:  -     amLODIPine (NORVASC) 10 mg tablet; Take 1 tablet (10 mg total) by mouth daily  -     losartan (COZAAR) 100 MG tablet; Take 1 tablet (100 mg total) by mouth daily  -     TSH, 3rd generation with Free T4 reflex; Future  -     Lipid Panel with Direct LDL reflex; Future    2  Bashir's esophagus without dysplasia  Assessment & Plan:    I am going to switch him to [address] [place] [address]  It was discussed about possible side effects  Continue follow-up with GI       3  Hyperglycemia  Assessment & Plan: It was discussed about low carb diet and regular exercise  Continue to monitor fasting glucose and A1c  Orders:  -     Hemoglobin A1C; Future    4  Prostate cancer screening  -     PSA, Total Screen; Future    5  Motion sickness, sequela  Assessment & Plan:    I was given prescription for scopolamine patches  It was discussed about possible side effects  Orders:  -     scopolamine (TRANSDERM-SCOP) 1 mg/3 days TD 72 hr patch; Place 1 patch on the skin over 72 hours every third day    6  Rheumatoid arthritis involving multiple sites with positive rheumatoid factor ([facility] [address] )  Assessment & Plan:    Fair controlled  Continue same medications and continue follow-up with Rheumatology  7  Abnormal wellness exam  Assessment & Plan: It was discussed about immunizations, diet, exercise and safety measures  8  Obesity (BMI 30 0-34  9)      BMI Counseling: Body mass index is 34 7 kg/m²  The BMI is above normal  Nutrition recommendations include decreasing portion sizes, encouraging healthy choices of fruits and vegetables and decreasing fast food intake   Rationale for BMI follow-up plan is due to patient being overweight or obese  Depression Screening and Follow-up Plan: Patient was screened for depression during today's encounter  They screened negative with a PHQ-2 score of 0  Subjective       He is here today for follow-up multiple medical problems  He has been taking his medications  He denies any side effects from his medications  He is requesting scopolamine patches to use when he get on his boat for [place]  His GERD symptoms are not  very well controlled on pantoprazole  He has history of Bashir esophagus  He follow-up with GI  Hyperlipidemia  Pertinent negatives include no chest pain or shortness of breath  Review of Systems   Constitutional: Negative for chills and fever  HENT: Negative for trouble swallowing  Eyes: Negative for visual disturbance  Respiratory: Negative for cough and shortness of breath  Cardiovascular: Negative for chest pain and palpitations  Gastrointestinal: Negative for abdominal pain, blood in stool and vomiting  Endocrine: Negative for cold intolerance and heat intolerance  Genitourinary: Negative for difficulty urinating and dysuria  Musculoskeletal: Negative for gait problem  Skin: Negative for rash  Neurological: Negative for dizziness, syncope and headaches  Hematological: Negative for adenopathy  Psychiatric/Behavioral: Negative for behavioral problems         Past Medical History:   Diagnosis Date   • GERD (gastroesophageal reflux disease)    • Hypertension      Past Surgical History:   Procedure Laterality Date   • ROTATOR CUFF REPAIR     • SHOULDER SURGERY Left      Family History   Problem Relation Age of Onset   • Diabetes Mother    • Diabetes Brother      Social History     Socioeconomic History   • Marital status: /Civil Union     Spouse name: None   • Number of children: None   • Years of education: None   • Highest education level: None   Occupational History   • None Tobacco Use   • Smoking status: Never   • Smokeless tobacco: Never   Vaping Use   • Vaping Use: Never used   Substance and Sexual Activity   • Alcohol use: Yes   • Drug use: Never   • Sexual activity: None   Other Topics Concern   • None   Social History Narrative   • None     Social Determinants of Health     Financial Resource Strain: Not on file   Food Insecurity: Not on file   Transportation Needs: Not on file   Physical Activity: Not on file   Stress: Not on file   Social Connections: Not on file   Intimate Partner Violence: Not on file   Housing Stability: Not on file     Current Outpatient Medications on File Prior to Visit   Medication Sig   • clobetasol (TEMOVATE) 0 05 % cream clobetasol 0 05 % topical cream   APPLY TO AFFECTED AREA TWICE A DAY   • folic acid (FOLVITE) 1 mg tablet Take 1,000 mcg by mouth daily   • leflunomide (ARAVA) 10 MG tablet leflunomide 10 mg tablet   TAKE 1 TABLET BY MOUTH EVERY DAY FOR 30 DAYS   • sucralfate (CARAFATE) 1 g tablet Take 1 g by mouth 4 (four) times a day as needed    • EPINEPHrine (EpiPen 2-Moo) 0 3 mg/0 3 mL SOAJ Inject 0 3 mL (0 3 mg total) into a muscle once for 1 dose (Patient not taking: Reported on 8/30/2021)   • ergocalciferol (VITAMIN D2) 50,000 units TAKE ONE CAPSULE BY MOUTH ONE TIME PER WEEK (Patient not taking: No sig reported)   • Na Sulfate-K Sulfate-Mg Sulf 17 5-3 13-1 6 GM/177ML SOLN Take as directed by the office   (Patient not taking: Reported on 4/8/2022)   • predniSONE 10 mg tablet  (Patient not taking: Reported on 8/30/2021)     Allergies   Allergen Reactions   • Bee Venom Anaphylaxis   • Latex Rash   • Other Rash     Immunization History   Administered Date(s) Administered   • INFLUENZA 11/16/2020   • Influenza, injectable, quadrivalent, preservative free 0 5 mL 11/16/2020   • Tdap 05/11/2018       Objective     /86 (BP Location: Left arm, Patient Position: Sitting, Cuff Size: Large)   Pulse 79   Temp 98 7 °F (37 1 °C) (Tympanic)   Ht 5' 9" (1 753 m)   Wt 107 kg (235 lb)   SpO2 98%   BMI 34 70 kg/m²     Physical Exam  Vitals and nursing note reviewed  Constitutional:       Appearance: He is well-developed  HENT:      Head: Normocephalic and atraumatic  Eyes:      Pupils: Pupils are equal, round, and reactive to light  Cardiovascular:      Rate and Rhythm: Normal rate and regular rhythm  Heart sounds: Normal heart sounds  Pulmonary:      Effort: Pulmonary effort is normal       Breath sounds: Normal breath sounds  Abdominal:      General: Bowel sounds are normal       Palpations: Abdomen is soft  Musculoskeletal:         General: Normal range of motion  Cervical back: Normal range of motion and neck supple  Lymphadenopathy:      Cervical: No cervical adenopathy  Skin:     General: Skin is warm  Findings: No rash  Neurological:      Mental Status: He is alert and oriented to person, place, and time  Cranial Nerves: No cranial nerve deficit  Lori Munoz MD BMI Counseling: Body mass index is 34 7 kg/m²  The BMI is above normal  Nutrition recommendations include reducing portion sizes, decreasing overall calorie intake and 3-5 servings of fruits/vegetables daily  Exercise recommendations include moderate aerobic physical activity for 150 minutes/week

## 2023-02-02 NOTE — ASSESSMENT & PLAN NOTE
It was discussed about low carb diet and regular exercise  Continue to monitor fasting glucose and A1c

## 2023-02-22 DIAGNOSIS — K22.70 BARRETT'S ESOPHAGUS WITHOUT DYSPLASIA: ICD-10-CM

## 2023-02-22 RX ORDER — ESOMEPRAZOLE MAGNESIUM 40 MG/1
CAPSULE, DELAYED RELEASE ORAL
Qty: 90 CAPSULE | Refills: 1 | Status: SHIPPED | OUTPATIENT
Start: 2023-02-22

## 2023-02-22 RX ORDER — PANTOPRAZOLE SODIUM 40 MG/1
TABLET, DELAYED RELEASE ORAL
Qty: 90 TABLET | Refills: 1 | Status: SHIPPED | OUTPATIENT
Start: 2023-02-22

## 2023-03-17 ENCOUNTER — TELEPHONE (OUTPATIENT)
Dept: FAMILY MEDICINE CLINIC | Facility: CLINIC | Age: 58
End: 2023-03-17

## 2023-03-17 NOTE — TELEPHONE ENCOUNTER
Received message from wife requesting to have latex allergy removed from chart as pt is having a issue getting his Enbrel  Filled  Wife states pt kahn not have a allergy to latex  I called wife and confirmed pt does not have latex allergy  She also l/m for RD doctor  Please advise  Hi, my  is a patient of Doctor JACKSON's  Sonja Trent's birth date [id number]  We're having a problem getting his Enbrel refilled through [address]  It's a backup  They help us because our insurance won't pay for his medicine for his RA  The problem is after a year now, this year they won't give him the shots because Sonja Beck has latex on his chart, an allergy, which he really doesn't have  It's something's on for a long period of time  He's had Enbrel for a year now with these shots  They're just will not give him the auto injector until that is taken off his chart  So if there's any way that can be done, I would sincerely appreciate it because he only has one week left for the meds and then we're in some sort of shape if you give me a call back at [phone]  Thank you  You received a voice mail from [facility]

## 2023-04-03 PROBLEM — Z12.5 PROSTATE CANCER SCREENING: Status: RESOLVED | Noted: 2023-02-02 | Resolved: 2023-04-03

## 2023-05-18 ENCOUNTER — APPOINTMENT (OUTPATIENT)
Dept: LAB | Age: 58
End: 2023-05-18

## 2023-05-18 DIAGNOSIS — M06.89 OTHER SPECIFIED RHEUMATOID ARTHRITIS, MULTIPLE SITES (HCC): ICD-10-CM

## 2023-05-18 DIAGNOSIS — Z79.899 ENCOUNTER FOR LONG-TERM (CURRENT) USE OF OTHER MEDICATIONS: ICD-10-CM

## 2023-05-18 LAB
ALBUMIN SERPL BCP-MCNC: 3.8 G/DL (ref 3.5–5)
ALP SERPL-CCNC: 92 U/L (ref 46–116)
ALT SERPL W P-5'-P-CCNC: 49 U/L (ref 12–78)
ANION GAP SERPL CALCULATED.3IONS-SCNC: 0 MMOL/L (ref 4–13)
AST SERPL W P-5'-P-CCNC: 24 U/L (ref 5–45)
BASOPHILS # BLD AUTO: 0.09 THOUSANDS/ÂΜL (ref 0–0.1)
BASOPHILS NFR BLD AUTO: 1 % (ref 0–1)
BILIRUB SERPL-MCNC: 0.55 MG/DL (ref 0.2–1)
BUN SERPL-MCNC: 18 MG/DL (ref 5–25)
CALCIUM SERPL-MCNC: 9 MG/DL (ref 8.3–10.1)
CHLORIDE SERPL-SCNC: 109 MMOL/L (ref 96–108)
CO2 SERPL-SCNC: 26 MMOL/L (ref 21–32)
CREAT SERPL-MCNC: 1.09 MG/DL (ref 0.6–1.3)
CRP SERPL QL: 10.1 MG/L
EOSINOPHIL # BLD AUTO: 0.24 THOUSAND/ÂΜL (ref 0–0.61)
EOSINOPHIL NFR BLD AUTO: 3 % (ref 0–6)
ERYTHROCYTE [DISTWIDTH] IN BLOOD BY AUTOMATED COUNT: 12.7 % (ref 11.6–15.1)
GFR SERPL CREATININE-BSD FRML MDRD: 76 ML/MIN/1.73SQ M
GLUCOSE P FAST SERPL-MCNC: 109 MG/DL (ref 65–99)
HCT VFR BLD AUTO: 49.2 % (ref 36.5–49.3)
HGB BLD-MCNC: 16.5 G/DL (ref 12–17)
IMM GRANULOCYTES # BLD AUTO: 0.03 THOUSAND/UL (ref 0–0.2)
IMM GRANULOCYTES NFR BLD AUTO: 0 % (ref 0–2)
LYMPHOCYTES # BLD AUTO: 2.54 THOUSANDS/ÂΜL (ref 0.6–4.47)
LYMPHOCYTES NFR BLD AUTO: 31 % (ref 14–44)
MCH RBC QN AUTO: 28.7 PG (ref 26.8–34.3)
MCHC RBC AUTO-ENTMCNC: 33.5 G/DL (ref 31.4–37.4)
MCV RBC AUTO: 86 FL (ref 82–98)
MONOCYTES # BLD AUTO: 0.88 THOUSAND/ÂΜL (ref 0.17–1.22)
MONOCYTES NFR BLD AUTO: 11 % (ref 4–12)
NEUTROPHILS # BLD AUTO: 4.42 THOUSANDS/ÂΜL (ref 1.85–7.62)
NEUTS SEG NFR BLD AUTO: 54 % (ref 43–75)
NRBC BLD AUTO-RTO: 0 /100 WBCS
PLATELET # BLD AUTO: 288 THOUSANDS/UL (ref 149–390)
PMV BLD AUTO: 9.7 FL (ref 8.9–12.7)
POTASSIUM SERPL-SCNC: 4.2 MMOL/L (ref 3.5–5.3)
PROT SERPL-MCNC: 7.5 G/DL (ref 6.4–8.4)
RBC # BLD AUTO: 5.74 MILLION/UL (ref 3.88–5.62)
SODIUM SERPL-SCNC: 135 MMOL/L (ref 135–147)
WBC # BLD AUTO: 8.2 THOUSAND/UL (ref 4.31–10.16)

## 2023-07-19 ENCOUNTER — APPOINTMENT (OUTPATIENT)
Dept: LAB | Facility: IMAGING CENTER | Age: 58
End: 2023-07-19
Payer: COMMERCIAL

## 2023-07-19 ENCOUNTER — OFFICE VISIT (OUTPATIENT)
Dept: FAMILY MEDICINE CLINIC | Facility: CLINIC | Age: 58
End: 2023-07-19
Payer: COMMERCIAL

## 2023-07-19 VITALS
DIASTOLIC BLOOD PRESSURE: 80 MMHG | BODY MASS INDEX: 34.51 KG/M2 | SYSTOLIC BLOOD PRESSURE: 126 MMHG | OXYGEN SATURATION: 96 % | HEART RATE: 79 BPM | RESPIRATION RATE: 16 BRPM | TEMPERATURE: 98.4 F | WEIGHT: 233 LBS | HEIGHT: 69 IN

## 2023-07-19 DIAGNOSIS — R73.9 HYPERGLYCEMIA: ICD-10-CM

## 2023-07-19 DIAGNOSIS — E78.49 OTHER HYPERLIPIDEMIA: Primary | ICD-10-CM

## 2023-07-19 DIAGNOSIS — K22.70 BARRETT'S ESOPHAGUS WITHOUT DYSPLASIA: ICD-10-CM

## 2023-07-19 DIAGNOSIS — I10 ESSENTIAL HYPERTENSION: ICD-10-CM

## 2023-07-19 DIAGNOSIS — Z12.5 PROSTATE CANCER SCREENING: ICD-10-CM

## 2023-07-19 DIAGNOSIS — M05.79 RHEUMATOID ARTHRITIS INVOLVING MULTIPLE SITES WITH POSITIVE RHEUMATOID FACTOR (HCC): ICD-10-CM

## 2023-07-19 DIAGNOSIS — K21.9 GASTROESOPHAGEAL REFLUX DISEASE, UNSPECIFIED WHETHER ESOPHAGITIS PRESENT: ICD-10-CM

## 2023-07-19 PROBLEM — Z87.39 HISTORY OF RHEUMATOID ARTHRITIS: Status: ACTIVE | Noted: 2023-05-31

## 2023-07-19 PROBLEM — Z92.25 HISTORY OF IMMUNOSUPPRESSIVE THERAPY: Status: ACTIVE | Noted: 2023-05-31

## 2023-07-19 PROBLEM — Z87.19 HISTORY OF BARRETT'S ESOPHAGUS: Status: ACTIVE | Noted: 2023-06-01

## 2023-07-19 LAB
CHOLEST SERPL-MCNC: 239 MG/DL
EST. AVERAGE GLUCOSE BLD GHB EST-MCNC: 103 MG/DL
HBA1C MFR BLD: 5.2 %
HDLC SERPL-MCNC: 45 MG/DL
LDLC SERPL CALC-MCNC: 168 MG/DL (ref 0–100)
PSA SERPL-MCNC: 1.39 NG/ML (ref 0–4)
TRIGL SERPL-MCNC: 131 MG/DL
TSH SERPL DL<=0.05 MIU/L-ACNC: 2.55 UIU/ML (ref 0.45–4.5)

## 2023-07-19 PROCEDURE — 80061 LIPID PANEL: CPT

## 2023-07-19 PROCEDURE — 84443 ASSAY THYROID STIM HORMONE: CPT

## 2023-07-19 PROCEDURE — G0103 PSA SCREENING: HCPCS

## 2023-07-19 PROCEDURE — 83036 HEMOGLOBIN GLYCOSYLATED A1C: CPT

## 2023-07-19 PROCEDURE — 36415 COLL VENOUS BLD VENIPUNCTURE: CPT

## 2023-07-19 PROCEDURE — 99214 OFFICE O/P EST MOD 30 MIN: CPT | Performed by: FAMILY MEDICINE

## 2023-07-19 RX ORDER — UPADACITINIB 15 MG/1
TABLET, EXTENDED RELEASE ORAL
COMMUNITY
Start: 2023-07-17

## 2023-07-19 RX ORDER — ROSUVASTATIN CALCIUM 5 MG/1
5 TABLET, COATED ORAL DAILY
Qty: 90 TABLET | Refills: 3 | Status: SHIPPED | OUTPATIENT
Start: 2023-07-19

## 2023-07-19 RX ORDER — SUCRALFATE ORAL 1 G/10ML
1 SUSPENSION ORAL 4 TIMES DAILY
Qty: 414 ML | Refills: 1 | Status: SHIPPED | OUTPATIENT
Start: 2023-07-19

## 2023-07-19 RX ORDER — SUCRALFATE 1 G/1
1 TABLET ORAL 4 TIMES DAILY PRN
Status: CANCELLED | OUTPATIENT
Start: 2023-07-19

## 2023-07-19 NOTE — ASSESSMENT & PLAN NOTE
Total cholesterol and LDL elevated   Start crestor 5mg tablet daily  Side effects reviewed   Discussed importance of diet and exercise   Repeat fasting lipid panel and CMP in 3 months

## 2023-07-19 NOTE — PROGRESS NOTES
Name: Isaias Rasmussen      : 1965      MRN: [id number]  Encounter Provider: Jemma Rose MD  Encounter Date: 2023   Encounter department: [facility]     1. Other hyperlipidemia  Assessment & Plan: Total cholesterol and LDL elevated   Start crestor 5mg tablet daily  Side effects reviewed   Discussed importance of diet and exercise   Repeat fasting lipid panel and CMP in 3 months     Orders:  -     rosuvastatin (CRESTOR) 5 mg tablet; Take 1 tablet (5 mg total) by mouth daily  -     Comprehensive metabolic panel; Future  -     Lipid Panel with Direct LDL reflex; Future    2. Essential hypertension  Assessment & Plan:  Well controlled   Continue amlodipine 10mg tablet  Continue losartan 100mg tablet  Continue to take BP at home   Discussed diet and exercise       3. Gastroesophageal reflux disease, unspecified whether esophagitis present  Assessment & Plan:  Continue nexium       4. Noel's esophagus without dysplasia  Assessment & Plan:  Continue nexium 40mg daily  Sucralfate 1g/10mL ordered     Orders:  -     sucralfate (CARAFATE) 1 g/10 mL suspension; Take 10 mL (1 g total) by mouth 4 (four) times a day    5. Rheumatoid arthritis involving multiple sites with positive rheumatoid factor (HCC)  Assessment & Plan:  Follows with rheumatology  Continue current medications              Subjective     Pt is a 55 yo M PMH of RA, HTN, GERD, noel's esophagus presents today for follow up. Pt states he feels good and his RA symptoms have improved since his medications have been changed. Taking all medications and denies any side effects. Well controlled on amlodipine and losartan for HTN. ROS negative - no complaints today. Denies any fevers, chills SOB, chest pain, palpitations, abdominal pain, N/V/D, blood in stool.      Review of Systems   Constitutional: Negative for activity change, appetite change, chills, diaphoresis, fatigue, fever and unexpected weight change. HENT: Negative for ear pain and sore throat. Eyes: Negative for pain and visual disturbance. Respiratory: Negative for cough, chest tightness, shortness of breath and wheezing. Cardiovascular: Negative for chest pain, palpitations and leg swelling. Gastrointestinal: Negative for abdominal pain, constipation, diarrhea, nausea and vomiting. Genitourinary: Negative for dysuria and hematuria. Musculoskeletal: Negative for arthralgias and back pain. Skin: Negative for color change and rash. Neurological: Negative for seizures, syncope, light-headedness and headaches. All other systems reviewed and are negative. Past Medical History:   Diagnosis Date   • GERD (gastroesophageal reflux disease)    • Hypertension      Past Surgical History:   Procedure Laterality Date   • ROTATOR CUFF REPAIR     • SHOULDER SURGERY Left      Family History   Problem Relation Age of Onset   • Diabetes Mother    • Diabetes Brother      Social History     Socioeconomic History   • Marital status: /Civil Union     Spouse name: None   • Number of children: None   • Years of education: None   • Highest education level: None   Occupational History   • None   Tobacco Use   • Smoking status: Never   • Smokeless tobacco: Never   Vaping Use   • Vaping Use: Never used   Substance and Sexual Activity   • Alcohol use:  Yes   • Drug use: Never   • Sexual activity: None   Other Topics Concern   • None   Social History Narrative   • None     Social Determinants of Health     Financial Resource Strain: Not on file   Food Insecurity: Not on file   Transportation Needs: Not on file   Physical Activity: Not on file   Stress: Not on file   Social Connections: Not on file   Intimate Partner Violence: Not on file   Housing Stability: Not on file     Current Outpatient Medications on File Prior to Visit   Medication Sig   • amLODIPine (NORVASC) 10 mg tablet Take 1 tablet (10 mg total) by mouth daily   • clobetasol (TEMOVATE) 0.05 % cream clobetasol 0.05 % topical cream   APPLY TO AFFECTED AREA TWICE A DAY   • EPINEPHrine (EpiPen 2-Moo) 0.3 mg/0.3 mL SOAJ Inject 0.3 mL (0.3 mg total) into a muscle once for 1 dose   • esomeprazole (NexIUM) 40 MG capsule TAKE 1 CAPSULE BY MOUTH EVERY DAY BEFORE BREAKFAST   • folic acid (FOLVITE) 1 mg tablet Take 1,000 mcg by mouth daily   • losartan (COZAAR) 100 MG tablet Take 1 tablet (100 mg total) by mouth daily   • pantoprazole (PROTONIX) 40 mg tablet TAKE 1 TABLET BY MOUTH EVERY DAY BEFORE BREAKFAST   • scopolamine (TRANSDERM-SCOP) 1 mg/3 days TD 72 hr patch Place 1 patch on the skin over 72 hours every third day   • Upadacitinib ER (Rinvoq) 15 MG TB24 Take 1 tablet every day by oral route. • [DISCONTINUED] sucralfate (CARAFATE) 1 g tablet Take 1 g by mouth 4 (four) times a day as needed    • leflunomide (ARAVA) 10 MG tablet leflunomide 10 mg tablet   TAKE 1 TABLET BY MOUTH EVERY DAY FOR 30 DAYS (Patient not taking: Reported on 7/19/2023)   • [DISCONTINUED] ergocalciferol (VITAMIN D2) 50,000 units TAKE ONE CAPSULE BY MOUTH ONE TIME PER WEEK (Patient not taking: No sig reported)   • [DISCONTINUED] Na Sulfate-K Sulfate-Mg Sulf 17.5-3.13-1.6 GM/177ML SOLN Take as directed by the office. • [DISCONTINUED] predniSONE 10 mg tablet  (Patient not taking: Reported on 8/30/2021)     Allergies   Allergen Reactions   • Bee Venom Anaphylaxis     Immunization History   Administered Date(s) Administered   • COVID-19 J&J ([employer]) vaccine 0.5 mL 06/07/2022   • INFLUENZA 11/16/2020   • Influenza, injectable, quadrivalent, preservative free 0.5 mL 11/16/2020   • Tdap 05/11/2018       Objective     /80 (BP Location: Left arm, Patient Position: Sitting, Cuff Size: Large)   Pulse 79   Temp 98.4 °F (36.9 °C) (Tympanic)   Resp 16   Ht 5' 9" (1.753 m)   Wt 106 kg (233 lb)   SpO2 96%   BMI 34.41 kg/m²     Physical Exam  Vitals and nursing note reviewed.    Constitutional:       General: He is not in acute distress. Appearance: Normal appearance. He is not ill-appearing, toxic-appearing or diaphoretic. HENT:      Head: Normocephalic and atraumatic. Nose: Nose normal.      Mouth/Throat:      Mouth: Mucous membranes are moist.      Pharynx: Oropharynx is clear. Eyes:      Extraocular Movements: Extraocular movements intact. Conjunctiva/sclera: Conjunctivae normal.   Cardiovascular:      Rate and Rhythm: Normal rate and regular rhythm. Pulses: Normal pulses. Heart sounds: Normal heart sounds. No murmur heard. No friction rub. No gallop. Pulmonary:      Effort: Pulmonary effort is normal. No respiratory distress. Breath sounds: Normal breath sounds. No wheezing, rhonchi or rales. Abdominal:      Palpations: Abdomen is soft. Skin:     General: Skin is warm and dry. Capillary Refill: Capillary refill takes less than 2 seconds. Neurological:      General: No focal deficit present. Mental Status: He is alert and oriented to person, place, and time. Mental status is at baseline.       Gait: Gait normal.   Psychiatric:         Mood and Affect: Mood normal.         Behavior: Behavior normal.       Nissa Rudolph MD

## 2023-07-19 NOTE — ASSESSMENT & PLAN NOTE
Well controlled   Continue amlodipine 10mg tablet  Continue losartan 100mg tablet  Continue to take BP at home   Discussed diet and exercise

## 2023-08-02 DIAGNOSIS — I10 ESSENTIAL HYPERTENSION: ICD-10-CM

## 2023-08-02 RX ORDER — LOSARTAN POTASSIUM 100 MG/1
100 TABLET ORAL DAILY
Qty: 90 TABLET | Refills: 1 | Status: SHIPPED | OUTPATIENT
Start: 2023-08-02

## 2023-08-25 DIAGNOSIS — K22.70 BARRETT'S ESOPHAGUS WITHOUT DYSPLASIA: ICD-10-CM

## 2023-08-25 RX ORDER — ESOMEPRAZOLE MAGNESIUM 40 MG/1
CAPSULE, DELAYED RELEASE ORAL
Qty: 30 CAPSULE | Refills: 5 | Status: SHIPPED | OUTPATIENT
Start: 2023-08-25

## 2023-09-27 ENCOUNTER — APPOINTMENT (OUTPATIENT)
Dept: LAB | Age: 58
End: 2023-09-27
Payer: COMMERCIAL

## 2023-09-27 DIAGNOSIS — Z79.899 ENCOUNTER FOR LONG-TERM (CURRENT) USE OF OTHER MEDICATIONS: ICD-10-CM

## 2023-09-27 DIAGNOSIS — M06.89 OTHER SPECIFIED RHEUMATOID ARTHRITIS, MULTIPLE SITES (HCC): ICD-10-CM

## 2023-09-27 DIAGNOSIS — K22.70 BARRETT'S ESOPHAGUS WITHOUT DYSPLASIA: ICD-10-CM

## 2023-09-27 LAB
ALBUMIN SERPL BCP-MCNC: 4.2 G/DL (ref 3.5–5)
ALP SERPL-CCNC: 68 U/L (ref 34–104)
ALT SERPL W P-5'-P-CCNC: 32 U/L (ref 7–52)
ANION GAP SERPL CALCULATED.3IONS-SCNC: 10 MMOL/L
AST SERPL W P-5'-P-CCNC: 23 U/L (ref 13–39)
BASOPHILS # BLD AUTO: 0.05 THOUSANDS/ÂΜL (ref 0–0.1)
BASOPHILS NFR BLD AUTO: 1 % (ref 0–1)
BILIRUB SERPL-MCNC: 0.55 MG/DL (ref 0.2–1)
BUN SERPL-MCNC: 16 MG/DL (ref 5–25)
CALCIUM SERPL-MCNC: 9.4 MG/DL (ref 8.4–10.2)
CHLORIDE SERPL-SCNC: 103 MMOL/L (ref 96–108)
CO2 SERPL-SCNC: 26 MMOL/L (ref 21–32)
CREAT SERPL-MCNC: 1.01 MG/DL (ref 0.6–1.3)
CRP SERPL QL: 1.2 MG/L
EOSINOPHIL # BLD AUTO: 0.12 THOUSAND/ÂΜL (ref 0–0.61)
EOSINOPHIL NFR BLD AUTO: 2 % (ref 0–6)
ERYTHROCYTE [DISTWIDTH] IN BLOOD BY AUTOMATED COUNT: 13 % (ref 11.6–15.1)
GFR SERPL CREATININE-BSD FRML MDRD: 82 ML/MIN/1.73SQ M
GLUCOSE P FAST SERPL-MCNC: 82 MG/DL (ref 65–99)
HCT VFR BLD AUTO: 45.4 % (ref 36.5–49.3)
HGB BLD-MCNC: 15.5 G/DL (ref 12–17)
IMM GRANULOCYTES # BLD AUTO: 0.06 THOUSAND/UL (ref 0–0.2)
IMM GRANULOCYTES NFR BLD AUTO: 1 % (ref 0–2)
LYMPHOCYTES # BLD AUTO: 2.36 THOUSANDS/ÂΜL (ref 0.6–4.47)
LYMPHOCYTES NFR BLD AUTO: 32 % (ref 14–44)
MCH RBC QN AUTO: 29.4 PG (ref 26.8–34.3)
MCHC RBC AUTO-ENTMCNC: 34.1 G/DL (ref 31.4–37.4)
MCV RBC AUTO: 86 FL (ref 82–98)
MONOCYTES # BLD AUTO: 0.67 THOUSAND/ÂΜL (ref 0.17–1.22)
MONOCYTES NFR BLD AUTO: 9 % (ref 4–12)
NEUTROPHILS # BLD AUTO: 4.15 THOUSANDS/ÂΜL (ref 1.85–7.62)
NEUTS SEG NFR BLD AUTO: 55 % (ref 43–75)
NRBC BLD AUTO-RTO: 0 /100 WBCS
PLATELET # BLD AUTO: 304 THOUSANDS/UL (ref 149–390)
PMV BLD AUTO: 9.6 FL (ref 8.9–12.7)
POTASSIUM SERPL-SCNC: 4.2 MMOL/L (ref 3.5–5.3)
PROT SERPL-MCNC: 7 G/DL (ref 6.4–8.4)
RBC # BLD AUTO: 5.27 MILLION/UL (ref 3.88–5.62)
SODIUM SERPL-SCNC: 139 MMOL/L (ref 135–147)
WBC # BLD AUTO: 7.41 THOUSAND/UL (ref 4.31–10.16)

## 2023-09-27 PROCEDURE — 86140 C-REACTIVE PROTEIN: CPT

## 2023-09-27 PROCEDURE — 85025 COMPLETE CBC W/AUTO DIFF WBC: CPT

## 2023-09-27 PROCEDURE — 80053 COMPREHEN METABOLIC PANEL: CPT

## 2023-09-27 PROCEDURE — 36415 COLL VENOUS BLD VENIPUNCTURE: CPT

## 2023-09-27 RX ORDER — ESOMEPRAZOLE MAGNESIUM 40 MG/1
CAPSULE, DELAYED RELEASE ORAL
Qty: 90 CAPSULE | Refills: 0 | Status: SHIPPED | OUTPATIENT
Start: 2023-09-27

## 2023-10-05 DIAGNOSIS — I10 ESSENTIAL HYPERTENSION: ICD-10-CM

## 2023-10-05 RX ORDER — AMLODIPINE BESYLATE 10 MG/1
10 TABLET ORAL DAILY
Qty: 90 TABLET | Refills: 1 | Status: SHIPPED | OUTPATIENT
Start: 2023-10-05

## 2023-10-18 ENCOUNTER — RA CDI HCC (OUTPATIENT)
Dept: OTHER | Facility: HOSPITAL | Age: 58
End: 2023-10-18

## 2023-10-18 NOTE — PROGRESS NOTES
[address] [facility] coding opportunities       Chart reviewed, no opportunity found: CHART REVIEWED, NO OPPORTUNITY FOUND        Patients Insurance        Commercial Insurance: [employer]

## 2023-10-24 ENCOUNTER — APPOINTMENT (OUTPATIENT)
Dept: LAB | Facility: IMAGING CENTER | Age: 58
End: 2023-10-24
Payer: COMMERCIAL

## 2023-10-24 DIAGNOSIS — E78.49 OTHER HYPERLIPIDEMIA: ICD-10-CM

## 2023-10-24 LAB
ALBUMIN SERPL BCP-MCNC: 4.4 G/DL (ref 3.5–5)
ALP SERPL-CCNC: 79 U/L (ref 34–104)
ALT SERPL W P-5'-P-CCNC: 61 U/L (ref 7–52)
ANION GAP SERPL CALCULATED.3IONS-SCNC: 8 MMOL/L
AST SERPL W P-5'-P-CCNC: 32 U/L (ref 13–39)
BILIRUB SERPL-MCNC: 0.6 MG/DL (ref 0.2–1)
BUN SERPL-MCNC: 20 MG/DL (ref 5–25)
CALCIUM SERPL-MCNC: 9.4 MG/DL (ref 8.4–10.2)
CHLORIDE SERPL-SCNC: 103 MMOL/L (ref 96–108)
CHOLEST SERPL-MCNC: 190 MG/DL
CO2 SERPL-SCNC: 26 MMOL/L (ref 21–32)
CREAT SERPL-MCNC: 1.01 MG/DL (ref 0.6–1.3)
GFR SERPL CREATININE-BSD FRML MDRD: 82 ML/MIN/1.73SQ M
GLUCOSE P FAST SERPL-MCNC: 86 MG/DL (ref 65–99)
HDLC SERPL-MCNC: 48 MG/DL
LDLC SERPL CALC-MCNC: 120 MG/DL (ref 0–100)
POTASSIUM SERPL-SCNC: 3.8 MMOL/L (ref 3.5–5.3)
PROT SERPL-MCNC: 7.3 G/DL (ref 6.4–8.4)
SODIUM SERPL-SCNC: 137 MMOL/L (ref 135–147)
TRIGL SERPL-MCNC: 108 MG/DL

## 2023-10-24 PROCEDURE — 80053 COMPREHEN METABOLIC PANEL: CPT

## 2023-10-24 PROCEDURE — 80061 LIPID PANEL: CPT

## 2023-10-24 PROCEDURE — 36415 COLL VENOUS BLD VENIPUNCTURE: CPT

## 2023-10-25 ENCOUNTER — OFFICE VISIT (OUTPATIENT)
Dept: FAMILY MEDICINE CLINIC | Facility: CLINIC | Age: 58
End: 2023-10-25
Payer: COMMERCIAL

## 2023-10-25 VITALS
BODY MASS INDEX: 34.18 KG/M2 | HEART RATE: 77 BPM | SYSTOLIC BLOOD PRESSURE: 128 MMHG | TEMPERATURE: 98.2 F | HEIGHT: 69 IN | WEIGHT: 230.8 LBS | RESPIRATION RATE: 16 BRPM | OXYGEN SATURATION: 97 % | DIASTOLIC BLOOD PRESSURE: 84 MMHG

## 2023-10-25 DIAGNOSIS — R73.9 HYPERGLYCEMIA: ICD-10-CM

## 2023-10-25 DIAGNOSIS — R79.89 ELEVATED LFTS: ICD-10-CM

## 2023-10-25 DIAGNOSIS — Z91.030 BEE STING ALLERGY: ICD-10-CM

## 2023-10-25 DIAGNOSIS — E78.49 OTHER HYPERLIPIDEMIA: ICD-10-CM

## 2023-10-25 DIAGNOSIS — I10 ESSENTIAL HYPERTENSION: Primary | ICD-10-CM

## 2023-10-25 PROCEDURE — 99214 OFFICE O/P EST MOD 30 MIN: CPT | Performed by: FAMILY MEDICINE

## 2023-10-25 RX ORDER — EPINEPHRINE 0.3 MG/.3ML
0.3 INJECTION SUBCUTANEOUS ONCE
Qty: 0.6 ML | Refills: 1 | Status: SHIPPED | OUTPATIENT
Start: 2023-10-25 | End: 2023-10-25

## 2023-10-25 NOTE — PROGRESS NOTES
Name: Vini Thurston      : 1965      MRN: [id number]  Encounter Provider: Ivelisse Ramirez MD  Encounter Date: 10/25/2023   Encounter department: [facility]     1. Essential hypertension  Assessment & Plan:  Well-controlled on amlodipine 10 mg daily. Continue same. We will continue to monitor. Orders:  -     CBC and differential; Future  -     Comprehensive metabolic panel; Future  -     Lipid Panel with Direct LDL reflex; Future  -     TSH, 3rd generation with Free T4 reflex; Future    2. Other hyperlipidemia  Assessment & Plan:  LDL elevated but improved from before. Continue to follow low-fat diet and regular exercise. Continue on Crestor 5 mg daily. Continue to monitor fasting lipid profile and LFTs. Orders:  -     CBC and differential; Future  -     Comprehensive metabolic panel; Future  -     Lipid Panel with Direct LDL reflex; Future  -     TSH, 3rd generation with Free T4 reflex; Future    3. Hyperglycemia  Assessment & Plan:  Improved. Continue to follow low-carb diet and regular exercise. Continue to monitor. Orders:  -     PSA, Total Screen; Future    4. Bee sting allergy  -     EPINEPHrine (EpiPen 2-Moo) 0.3 mg/0.3 mL SOAJ; Inject 0.3 mL (0.3 mg total) into a muscle once for 1 dose    5. Elevated LFTs  Assessment & Plan:  Slight elevated ALT. I will repeat the blood work before next appointment. 6. BMI 34.0-34.9,adult           Subjective     Is here today for follow-up multiple medical problems. He has been taking his medication. Any side effect. He was started recently on Crestor 5 mg daily. His cholesterol improved. He denies any complaint. His liver enzymes came back slightly elevated. Hypertension  Pertinent negatives include no chest pain, headaches, palpitations or shortness of breath. Hyperlipidemia  Pertinent negatives include no chest pain or shortness of breath.      Review of Systems   Constitutional: Negative for chills and fever. HENT:  Negative for trouble swallowing. Eyes:  Negative for visual disturbance. Respiratory:  Negative for cough and shortness of breath. Cardiovascular:  Negative for chest pain and palpitations. Gastrointestinal:  Negative for abdominal pain, blood in stool and vomiting. Endocrine: Negative for cold intolerance and heat intolerance. Genitourinary:  Negative for difficulty urinating and dysuria. Musculoskeletal:  Negative for gait problem. Skin:  Negative for rash. Neurological:  Negative for dizziness, syncope and headaches. Hematological:  Negative for adenopathy. Psychiatric/Behavioral:  Negative for behavioral problems. Past Medical History:   Diagnosis Date   • GERD (gastroesophageal reflux disease)    • Hypertension      Past Surgical History:   Procedure Laterality Date   • ROTATOR CUFF REPAIR     • SHOULDER SURGERY Left      Family History   Problem Relation Age of Onset   • Diabetes Mother    • Diabetes Brother      Social History     Socioeconomic History   • Marital status: /Civil Union     Spouse name: None   • Number of children: None   • Years of education: None   • Highest education level: None   Occupational History   • None   Tobacco Use   • Smoking status: Never   • Smokeless tobacco: Never   Vaping Use   • Vaping Use: Never used   Substance and Sexual Activity   • Alcohol use:  Yes   • Drug use: Never   • Sexual activity: None   Other Topics Concern   • None   Social History Narrative   • None     Social Determinants of Health     Financial Resource Strain: Not on file   Food Insecurity: Not on file   Transportation Needs: Not on file   Physical Activity: Not on file   Stress: Not on file   Social Connections: Not on file   Intimate Partner Violence: Not on file   Housing Stability: Not on file     Current Outpatient Medications on File Prior to Visit   Medication Sig   • amLODIPine (NORVASC) 10 mg tablet TAKE 1 TABLET BY MOUTH EVERY DAY   • clobetasol (TEMOVATE) 0.05 % cream clobetasol 0.05 % topical cream   APPLY TO AFFECTED AREA TWICE A DAY   • esomeprazole (NexIUM) 40 MG capsule TAKE 1 CAPSULE BY MOUTH EVERY DAY BEFORE BREAKFAST   • folic acid (FOLVITE) 1 mg tablet Take 1,000 mcg by mouth daily   • losartan (COZAAR) 100 MG tablet TAKE 1 TABLET BY MOUTH EVERY DAY   • pantoprazole (PROTONIX) 40 mg tablet TAKE 1 TABLET BY MOUTH EVERY DAY BEFORE BREAKFAST   • rosuvastatin (CRESTOR) 5 mg tablet Take 1 tablet (5 mg total) by mouth daily   • scopolamine (TRANSDERM-SCOP) 1 mg/3 days TD 72 hr patch Place 1 patch on the skin over 72 hours every third day   • sucralfate (CARAFATE) 1 g/10 mL suspension Take 10 mL (1 g total) by mouth 4 (four) times a day   • Upadacitinib ER (Rinvoq) 15 MG TB24 Take 1 tablet every day by oral route. • [DISCONTINUED] EPINEPHrine (EpiPen 2-Moo) 0.3 mg/0.3 mL SOAJ Inject 0.3 mL (0.3 mg total) into a muscle once for 1 dose   • leflunomide (ARAVA) 10 MG tablet leflunomide 10 mg tablet   TAKE 1 TABLET BY MOUTH EVERY DAY FOR 30 DAYS (Patient not taking: Reported on 7/19/2023)     Allergies   Allergen Reactions   • Bee Venom Anaphylaxis   • Honey Bee Venom Anaphylaxis     Immunization History   Administered Date(s) Administered   • COVID-19 J&J ([employer]gie) vaccine 0.5 mL 06/07/2022   • INFLUENZA 11/16/2020   • Influenza, injectable, quadrivalent, preservative free 0.5 mL 11/16/2020   • Tdap 05/11/2018       Objective     /84 (BP Location: Left arm, Patient Position: Sitting, Cuff Size: Large)   Pulse 77   Temp 98.2 °F (36.8 °C) (Tympanic)   Resp 16   Ht 5' 9" (1.753 m)   Wt 105 kg (230 lb 12.8 oz)   SpO2 97%   BMI 34.08 kg/m²     Physical Exam  Vitals and nursing note reviewed. Constitutional:       Appearance: He is well-developed. HENT:      Head: Normocephalic and atraumatic. Eyes:      Pupils: Pupils are equal, round, and reactive to light.    Cardiovascular:      Rate and Rhythm: Normal rate and regular rhythm. Heart sounds: Normal heart sounds. Pulmonary:      Effort: Pulmonary effort is normal.      Breath sounds: Normal breath sounds. Abdominal:      General: Bowel sounds are normal.      Palpations: Abdomen is soft. Musculoskeletal:      Cervical back: Normal range of motion and neck supple. Lymphadenopathy:      Cervical: No cervical adenopathy. Skin:     General: Skin is warm. Findings: No rash. Neurological:      Mental Status: He is alert and oriented to person, place, and time. Merlin Cross MD  BMI Counseling: Body mass index is 34.08 kg/m². The BMI is above normal. Nutrition recommendations include reducing portion sizes, decreasing overall calorie intake, and 3-5 servings of fruits/vegetables daily. Exercise recommendations include moderate aerobic physical activity for 150 minutes/week.

## 2023-10-25 NOTE — ASSESSMENT & PLAN NOTE
LDL elevated but improved from before. Continue to follow low-fat diet and regular exercise. Continue on Crestor 5 mg daily. Continue to monitor fasting lipid profile and LFTs.

## 2023-12-05 ENCOUNTER — TELEPHONE (OUTPATIENT)
Dept: PAIN MEDICINE | Facility: CLINIC | Age: 58
End: 2023-12-05

## 2023-12-05 NOTE — TELEPHONE ENCOUNTER
Spoke with patient, he is not interested at this time to see Rheumatology. Will call if he would like an appointment.

## 2023-12-23 DIAGNOSIS — K22.70 BARRETT'S ESOPHAGUS WITHOUT DYSPLASIA: ICD-10-CM

## 2023-12-26 RX ORDER — ESOMEPRAZOLE MAGNESIUM 40 MG/1
CAPSULE, DELAYED RELEASE ORAL
Qty: 30 CAPSULE | Refills: 2 | Status: SHIPPED | OUTPATIENT
Start: 2023-12-26

## 2024-01-15 ENCOUNTER — OFFICE VISIT (OUTPATIENT)
Dept: URGENT CARE | Age: 59
End: 2024-01-15
Payer: COMMERCIAL

## 2024-01-15 ENCOUNTER — APPOINTMENT (OUTPATIENT)
Dept: RADIOLOGY | Age: 59
End: 2024-01-15
Payer: COMMERCIAL

## 2024-01-15 VITALS
DIASTOLIC BLOOD PRESSURE: 84 MMHG | TEMPERATURE: 97.9 F | SYSTOLIC BLOOD PRESSURE: 140 MMHG | OXYGEN SATURATION: 99 % | RESPIRATION RATE: 18 BRPM | HEART RATE: 89 BPM

## 2024-01-15 DIAGNOSIS — S20.212A CONTUSION OF RIB ON LEFT SIDE, INITIAL ENCOUNTER: Primary | ICD-10-CM

## 2024-01-15 DIAGNOSIS — R07.81 RIB PAIN ON LEFT SIDE: ICD-10-CM

## 2024-01-15 DIAGNOSIS — M62.838 MUSCLE SPASM: ICD-10-CM

## 2024-01-15 PROCEDURE — 99213 OFFICE O/P EST LOW 20 MIN: CPT

## 2024-01-15 PROCEDURE — 71101 X-RAY EXAM UNILAT RIBS/CHEST: CPT

## 2024-01-15 RX ORDER — CYCLOBENZAPRINE HCL 5 MG
5 TABLET ORAL 3 TIMES DAILY PRN
Qty: 21 TABLET | Refills: 0 | Status: SHIPPED | OUTPATIENT
Start: 2024-01-15

## 2024-01-15 RX ORDER — NAPROXEN 500 MG/1
500 TABLET ORAL 2 TIMES DAILY WITH MEALS
Qty: 30 TABLET | Refills: 0 | Status: SHIPPED | OUTPATIENT
Start: 2024-01-15

## 2024-01-15 NOTE — PROGRESS NOTES
[facility]        NAME: Chang Trent is a 58 y.o. male  : 1965    MRN: [id number]  DATE: January 15, 2024  TIME: 5:23 PM    Assessment and Plan   Contusion of rib on left side, initial encounter [S20.212A]  1. Contusion of rib on left side, initial encounter        2. Rib pain on left side  XR ribs left w pa chest min 3 views    naproxen (Naprosyn) 500 mg tablet      3. Muscle spasm  cyclobenzaprine (FLEXERIL) 5 mg tablet        Left rib and chest xray reviewed: Possible left 4th rib fracture. Pending radiology final read.    Patient refused EKG.    Patient Instructions     Start naproxen as prescribed  Start flexeril as prescribed  Use incentive spirometer as discussed  Tylenol as needed  Ice 20 minutes 3-4 times per day  Insulate the skin from the ice to prevent frostbite  Follow up with PCP in 3-5 days.  Proceed to  ER if symptoms worsen.    Chief Complaint     Chief Complaint   Patient presents with    Rib Pain     Left sided rib pain and falling onto log Friday on left rib area. Pain with coughing, sneezing.         History of Present Illness       Patient is a 59 yo male with significant PMH OA and HTN presenting in the clinic today for left rib pain x 3 days. Patient states he was in a wooded area looking [place] deer antlers when he slipped and fell between two logs. Patient states he struck the logs along the right side of his thoracic back and left side of ribs. Denies head strike and LOC. Denies the use of blood thinners. Patient locates their pain to the left ribs along the lateral clavicular line and right side of thoracic back. Admits bruising along the left ribs. Admits pain is exacerbated with sneezing, coughing, and deep breaths.Denies fever, chills, headache, numbness, tingling, swelling, erythema, warmth, chest pain, and SOB. Admits the use of Advil and heat therapy for symptom management. Denies prior similar injuries.        Review of Systems   Review of Systems   Constitutional:   Negative for chills and fever.   Respiratory:  Negative for shortness of breath.    Cardiovascular:  Negative for chest pain.   Gastrointestinal:  Negative for abdominal pain, diarrhea, nausea and vomiting.   Musculoskeletal:  Positive for arthralgias and back pain.   Skin:  Negative for rash and wound.   Neurological:  Negative for numbness and headaches.         Current Medications       Current Outpatient Medications:     amLODIPine (NORVASC) 10 mg tablet, TAKE 1 TABLET BY MOUTH EVERY DAY, Disp: 90 tablet, Rfl: 1    clobetasol (TEMOVATE) 0.05 % cream, clobetasol 0.05 % topical cream  APPLY TO AFFECTED AREA TWICE A DAY, Disp: , Rfl:     cyclobenzaprine (FLEXERIL) 5 mg tablet, Take 1 tablet (5 mg total) by mouth 3 (three) times a day as needed for muscle spasms, Disp: 21 tablet, Rfl: 0    esomeprazole (NexIUM) 40 MG capsule, TAKE 1 CAPSULE BY MOUTH EVERY DAY BEFORE BREAKFAST, Disp: 30 capsule, Rfl: 2    folic acid (FOLVITE) 1 mg tablet, Take 1,000 mcg by mouth daily, Disp: , Rfl:     losartan (COZAAR) 100 MG tablet, TAKE 1 TABLET BY MOUTH EVERY DAY, Disp: 90 tablet, Rfl: 1    naproxen (Naprosyn) 500 mg tablet, Take 1 tablet (500 mg total) by mouth 2 (two) times a day with meals, Disp: 30 tablet, Rfl: 0    pantoprazole (PROTONIX) 40 mg tablet, TAKE 1 TABLET BY MOUTH EVERY DAY BEFORE BREAKFAST, Disp: 90 tablet, Rfl: 1    rosuvastatin (CRESTOR) 5 mg tablet, Take 1 tablet (5 mg total) by mouth daily, Disp: 90 tablet, Rfl: 3    scopolamine (TRANSDERM-SCOP) 1 mg/3 days TD 72 hr patch, Place 1 patch on the skin over 72 hours every third day, Disp: 10 patch, Rfl: 11    sucralfate (CARAFATE) 1 g/10 mL suspension, Take 10 mL (1 g total) by mouth 4 (four) times a day, Disp: 414 mL, Rfl: 1    EPINEPHrine (EpiPen 2-Moo) 0.3 mg/0.3 mL SOAJ, Inject 0.3 mL (0.3 mg total) into a muscle once for 1 dose, Disp: 0.6 mL, Rfl: 1    leflunomide (ARAVA) 10 MG tablet, leflunomide 10 mg tablet  TAKE 1 TABLET BY MOUTH EVERY DAY FOR 30 DAYS  (Patient not taking: Reported on 7/19/2023), Disp: , Rfl:     Upadacitinib ER (Rinvoq) 15 MG TB24, Take 1 tablet every day by oral route., Disp: , Rfl:     Current Allergies     Allergies as of 01/15/2024 - Reviewed 01/15/2024   Allergen Reaction Noted    Bee venom Anaphylaxis 03/05/2015    Honey bee venom Anaphylaxis 10/25/2023            The following portions of the patient's history were reviewed and updated as appropriate: allergies, current medications, past family history, past medical history, past social history, past surgical history and problem list.     Past Medical History:   Diagnosis Date    GERD (gastroesophageal reflux disease)     Hypertension        Past Surgical History:   Procedure Laterality Date    ROTATOR CUFF REPAIR      SHOULDER SURGERY Left        Family History   Problem Relation Age of Onset    Diabetes Mother     Diabetes Brother          Medications have been verified.        Objective   /84   Pulse 89   Temp 97.9 °F (36.6 °C)   Resp 18   SpO2 99%        Physical Exam     Physical Exam  Vitals reviewed.   Constitutional:       General: He is not in acute distress.     Appearance: Normal appearance. He is normal weight. He is not ill-appearing.   HENT:      Head: Normocephalic.      Nose: Nose normal. No congestion or rhinorrhea.      Mouth/Throat:      Mouth: Mucous membranes are moist.   Eyes:      General:         Right eye: No discharge.         Left eye: No discharge.      Conjunctiva/sclera: Conjunctivae normal.   Cardiovascular:      Rate and Rhythm: Normal rate and regular rhythm.      Pulses: Normal pulses.      Heart sounds: Normal heart sounds. No murmur heard.     No friction rub. No gallop.   Pulmonary:      Effort: Pulmonary effort is normal. No respiratory distress.      Breath sounds: Normal breath sounds. No stridor. No wheezing, rhonchi or rales.   Chest:      Chest wall: Tenderness (left anterior ribs) present. No mass, swelling, crepitus or edema.           Comments: Mild bruising noted along the left ribs along lateral clavicular line.  Musculoskeletal:      Cervical back: Normal, normal range of motion and neck supple. No tenderness.      Thoracic back: Spasms (right paraspinous region) and tenderness (right paraspinous) present. No swelling or deformity. Normal range of motion.      Lumbar back: Normal.   Lymphadenopathy:      Cervical: No cervical adenopathy.   Skin:     General: Skin is warm.      Findings: No rash.   Neurological:      Mental Status: He is alert.   Psychiatric:         Mood and Affect: Mood normal.         Behavior: Behavior normal.

## 2024-01-15 NOTE — PATIENT INSTRUCTIONS
Start naproxen as prescribed  Use incentive spirometer as discussed  Tylenol as needed  Ice 20 minutes 3-4 times per day  Insulate the skin from the ice to prevent frostbite  Follow up with PCP in 3-5 days.  Proceed to ER if symptoms worsen.

## 2024-02-01 DIAGNOSIS — I10 ESSENTIAL HYPERTENSION: ICD-10-CM

## 2024-02-01 RX ORDER — LOSARTAN POTASSIUM 100 MG/1
100 TABLET ORAL DAILY
Qty: 90 TABLET | Refills: 1 | Status: SHIPPED | OUTPATIENT
Start: 2024-02-01

## 2024-02-21 PROBLEM — Z00.00 HEALTHCARE MAINTENANCE: Status: RESOLVED | Noted: 2020-10-06 | Resolved: 2024-02-21

## 2024-02-21 PROBLEM — Z12.11 ENCOUNTER FOR SCREENING FOR MALIGNANT NEOPLASM OF COLON: Status: RESOLVED | Noted: 2019-09-24 | Resolved: 2024-02-21

## 2024-02-21 PROBLEM — J06.9 VIRAL URI: Status: RESOLVED | Noted: 2019-09-24 | Resolved: 2024-02-21

## 2024-02-21 PROBLEM — U07.1 ACUTE RESPIRATORY DISEASE DUE TO COVID-19 VIRUS: Status: RESOLVED | Noted: 2020-04-05 | Resolved: 2024-02-21

## 2024-02-21 PROBLEM — J06.9 ACUTE RESPIRATORY DISEASE DUE TO COVID-19 VIRUS: Status: RESOLVED | Noted: 2020-04-05 | Resolved: 2024-02-21

## 2024-03-20 ENCOUNTER — OFFICE VISIT (OUTPATIENT)
Dept: FAMILY MEDICINE CLINIC | Facility: CLINIC | Age: 59
End: 2024-03-20
Payer: COMMERCIAL

## 2024-03-20 VITALS
DIASTOLIC BLOOD PRESSURE: 84 MMHG | RESPIRATION RATE: 16 BRPM | SYSTOLIC BLOOD PRESSURE: 136 MMHG | WEIGHT: 227.2 LBS | HEART RATE: 78 BPM | TEMPERATURE: 99 F | HEIGHT: 69 IN | OXYGEN SATURATION: 98 % | BODY MASS INDEX: 33.65 KG/M2

## 2024-03-20 DIAGNOSIS — K22.70 BARRETT'S ESOPHAGUS WITHOUT DYSPLASIA: ICD-10-CM

## 2024-03-20 DIAGNOSIS — Z79.4 ENCOUNTER FOR LONG-TERM (CURRENT) USE OF INSULIN (HCC): ICD-10-CM

## 2024-03-20 DIAGNOSIS — E78.49 OTHER HYPERLIPIDEMIA: Primary | ICD-10-CM

## 2024-03-20 DIAGNOSIS — Z12.5 PROSTATE CANCER SCREENING: ICD-10-CM

## 2024-03-20 DIAGNOSIS — M05.79 RHEUMATOID ARTHRITIS INVOLVING MULTIPLE SITES WITH POSITIVE RHEUMATOID FACTOR (HCC): ICD-10-CM

## 2024-03-20 DIAGNOSIS — Z00.01 ABNORMAL WELLNESS EXAM: ICD-10-CM

## 2024-03-20 DIAGNOSIS — L08.9 SKIN INFECTION: ICD-10-CM

## 2024-03-20 DIAGNOSIS — G47.33 SEVERE OBSTRUCTIVE SLEEP APNEA: ICD-10-CM

## 2024-03-20 DIAGNOSIS — I10 ESSENTIAL HYPERTENSION: ICD-10-CM

## 2024-03-20 DIAGNOSIS — R73.9 HYPERGLYCEMIA: ICD-10-CM

## 2024-03-20 PROCEDURE — 99396 PREV VISIT EST AGE 40-64: CPT | Performed by: FAMILY MEDICINE

## 2024-03-20 PROCEDURE — 99214 OFFICE O/P EST MOD 30 MIN: CPT | Performed by: FAMILY MEDICINE

## 2024-03-20 RX ORDER — ONDANSETRON 8 MG/1
TABLET, ORALLY DISINTEGRATING ORAL
COMMUNITY

## 2024-03-20 RX ORDER — ESOMEPRAZOLE MAGNESIUM 40 MG/1
40 CAPSULE, DELAYED RELEASE ORAL
Qty: 90 CAPSULE | Refills: 2 | Status: SHIPPED | OUTPATIENT
Start: 2024-03-20

## 2024-03-20 NOTE — PROGRESS NOTES
Name: Chang Trent      : 1965      MRN: [id number]  Encounter Provider: Isabela Rain MD  Encounter Date: 3/20/2024   Encounter department: [facility]    Assessment & Plan     1. Other hyperlipidemia  Assessment & Plan:  LDL elevated but improved from before.  Continue to follow low-fat diet and regular exercise.  Continue on Crestor 5 mg daily.  Continue to monitor fasting lipid profile and LFTs.    Orders:  -     CBC and differential; Future  -     Comprehensive metabolic panel; Future  -     Lipid Panel with Direct LDL reflex; Future  -     TSH, 3rd generation with Free T4 reflex; Future    2. Essential hypertension  Assessment & Plan:  Well-controlled on amlodipine 10 mg daily.  Continue same.  We will continue to monitor.    Orders:  -     CBC and differential; Future  -     Comprehensive metabolic panel; Future  -     Lipid Panel with Direct LDL reflex; Future  -     TSH, 3rd generation with Free T4 reflex; Future    3. Hyperglycemia  Assessment & Plan:  Improved.  Continue to follow low-carb diet and regular exercise.  Continue to monitor.    Orders:  -     Hemoglobin A1C; Future    4. Prostate cancer screening  -     PSA, Total Screen; Future    5. Rheumatoid arthritis involving multiple sites with positive rheumatoid factor (HCC)  Assessment & Plan:  Follows with rheumatology  Continue current medications       6. Bashir's esophagus without dysplasia  Assessment & Plan:  Stable.  Continue nexium 40mg daily  Was given refill.    Orders:  -     esomeprazole (NexIUM) 40 MG capsule; Take 1 capsule (40 mg total) by mouth daily before breakfast    7. Encounter for long-term (current) use of insulin (HCC)    8. Skin infection  Assessment & Plan:  He was given prescription for Bactroban cream.    Orders:  -     mupirocin (BACTROBAN) 2 % ointment; Apply topically 3 (three) times a day    9. Severe obstructive sleep apnea  Assessment & Plan:  He does not use his CPAP  machine.      10. Abnormal wellness exam  Assessment & Plan:  It was discussed about immunizations, diet, exercise and safety measures.           Subjective     He is here today for follow-up multiple medical problems and for wellness exam.  He has been taking his medications.  He denies any side effects to his medications.  He did not get his blood work done yet.  He has been trying to watch his diet.    Hypertension  Pertinent negatives include no chest pain, headaches, palpitations or shortness of breath.   Hyperlipidemia  Pertinent negatives include no chest pain or shortness of breath.     Review of Systems   Constitutional:  Negative for chills and fever.   HENT:  Negative for trouble swallowing.    Eyes:  Negative for visual disturbance.   Respiratory:  Negative for cough and shortness of breath.    Cardiovascular:  Negative for chest pain and palpitations.   Gastrointestinal:  Negative for abdominal pain, blood in stool and vomiting.   Endocrine: Negative for cold intolerance and heat intolerance.   Genitourinary:  Negative for difficulty urinating and dysuria.   Musculoskeletal:  Negative for gait problem.   Skin:  Positive for color change.   Neurological:  Negative for dizziness, syncope and headaches.   Hematological:  Negative for adenopathy.   Psychiatric/Behavioral:  Negative for behavioral problems.        Past Medical History:   Diagnosis Date   • GERD (gastroesophageal reflux disease)    • Hypertension      Past Surgical History:   Procedure Laterality Date   • ROTATOR CUFF REPAIR     • SHOULDER SURGERY Left      Family History   Problem Relation Age of Onset   • Diabetes Mother    • Diabetes Brother      Social History     Socioeconomic History   • Marital status: /Civil Union     Spouse name: None   • Number of children: None   • Years of education: None   • Highest education level: None   Occupational History   • None   Tobacco Use   • Smoking status: Never   • Smokeless tobacco: Never    Vaping Use   • Vaping status: Never Used   Substance and Sexual Activity   • Alcohol use: Yes   • Drug use: Never   • Sexual activity: None   Other Topics Concern   • None   Social History Narrative   • None     Social Determinants of Health     Financial Resource Strain: Not on file   Food Insecurity: Not on file   Transportation Needs: Not on file   Physical Activity: Not on file   Stress: Not on file   Social Connections: Not on file   Intimate Partner Violence: Not on file   Housing Stability: Not on file     Current Outpatient Medications on File Prior to Visit   Medication Sig   • amLODIPine (NORVASC) 10 mg tablet TAKE 1 TABLET BY MOUTH EVERY DAY   • losartan (COZAAR) 100 MG tablet TAKE 1 TABLET BY MOUTH EVERY DAY   • rosuvastatin (CRESTOR) 5 mg tablet Take 1 tablet (5 mg total) by mouth daily   • sucralfate (CARAFATE) 1 g/10 mL suspension Take 10 mL (1 g total) by mouth 4 (four) times a day   • Upadacitinib ER (Rinvoq) 15 MG TB24 Take 1 tablet every day by oral route.   • [DISCONTINUED] esomeprazole (NexIUM) 40 MG capsule TAKE 1 CAPSULE BY MOUTH EVERY DAY BEFORE BREAKFAST   • clobetasol (TEMOVATE) 0.05 % cream clobetasol 0.05 % topical cream   APPLY TO AFFECTED AREA TWICE A DAY   • cyclobenzaprine (FLEXERIL) 5 mg tablet Take 1 tablet (5 mg total) by mouth 3 (three) times a day as needed for muscle spasms (Patient not taking: Reported on 3/20/2024)   • EPINEPHrine (EpiPen 2-Moo) 0.3 mg/0.3 mL SOAJ Inject 0.3 mL (0.3 mg total) into a muscle once for 1 dose   • folic acid (FOLVITE) 1 mg tablet Take 1,000 mcg by mouth daily (Patient not taking: Reported on 3/20/2024)   • leflunomide (ARAVA) 10 MG tablet leflunomide 10 mg tablet   TAKE 1 TABLET BY MOUTH EVERY DAY FOR 30 DAYS (Patient not taking: Reported on 7/19/2023)   • naproxen (Naprosyn) 500 mg tablet Take 1 tablet (500 mg total) by mouth 2 (two) times a day with meals (Patient not taking: Reported on 3/20/2024)   • ondansetron (ZOFRAN-ODT) 8 mg  "disintegrating tablet DISSOLVE 1 TABLET (8 MG TOTAL) IN CHEEK EVERY 8 (EIGHT) HOURS AS NEEDED FOR NAUSEA OR VOMITING. (Patient not taking: Reported on 3/20/2024)   • pantoprazole (PROTONIX) 40 mg tablet TAKE 1 TABLET BY MOUTH EVERY DAY BEFORE BREAKFAST (Patient not taking: Reported on 3/20/2024)   • scopolamine (TRANSDERM-SCOP) 1 mg/3 days TD 72 hr patch Place 1 patch on the skin over 72 hours every third day     Allergies   Allergen Reactions   • Bee Venom Anaphylaxis   • Honey Bee Venom Anaphylaxis     Immunization History   Administered Date(s) Administered   • COVID-19 J&J ([employer]) vaccine 0.5 mL 06/07/2022   • INFLUENZA 11/16/2020   • Influenza, injectable, quadrivalent, preservative free 0.5 mL 11/16/2020   • Tdap 05/11/2018       Objective     /84 (BP Location: Left arm, Patient Position: Sitting, Cuff Size: Large)   Pulse 78   Temp 99 °F (37.2 °C) (Tympanic)   Resp 16   Ht 5' 9\" (1.753 m)   Wt 103 kg (227 lb 3.2 oz)   SpO2 98%   BMI 33.55 kg/m²     Physical Exam  Vitals and nursing note reviewed.   Constitutional:       Appearance: He is well-developed.   HENT:      Head: Normocephalic and atraumatic.   Eyes:      Pupils: Pupils are equal, round, and reactive to light.   Cardiovascular:      Rate and Rhythm: Normal rate and regular rhythm.      Heart sounds: Normal heart sounds.   Pulmonary:      Effort: Pulmonary effort is normal.      Breath sounds: Normal breath sounds.   Abdominal:      General: Bowel sounds are normal.      Palpations: Abdomen is soft.   Musculoskeletal:      Cervical back: Normal range of motion and neck supple.   Lymphadenopathy:      Cervical: No cervical adenopathy.   Skin:     General: Skin is warm.      Findings: Erythema (right forearm) present.   Neurological:      Mental Status: He is alert and oriented to person, place, and time.       Isabela Rain MD    "

## 2024-03-29 DIAGNOSIS — I10 ESSENTIAL HYPERTENSION: ICD-10-CM

## 2024-03-29 RX ORDER — AMLODIPINE BESYLATE 10 MG/1
10 TABLET ORAL DAILY
Qty: 90 TABLET | Refills: 1 | Status: SHIPPED | OUTPATIENT
Start: 2024-03-29

## 2024-04-05 ENCOUNTER — APPOINTMENT (OUTPATIENT)
Dept: LAB | Facility: IMAGING CENTER | Age: 59
End: 2024-04-05
Payer: COMMERCIAL

## 2024-04-05 DIAGNOSIS — M06.89 OTHER SPECIFIED RHEUMATOID ARTHRITIS, MULTIPLE SITES (HCC): ICD-10-CM

## 2024-04-05 DIAGNOSIS — E78.49 OTHER HYPERLIPIDEMIA: ICD-10-CM

## 2024-04-05 DIAGNOSIS — Z79.899 ENCOUNTER FOR LONG-TERM (CURRENT) USE OF OTHER MEDICATIONS: ICD-10-CM

## 2024-04-05 DIAGNOSIS — R73.9 HYPERGLYCEMIA: ICD-10-CM

## 2024-04-05 DIAGNOSIS — I10 ESSENTIAL HYPERTENSION: ICD-10-CM

## 2024-04-05 DIAGNOSIS — Z12.5 PROSTATE CANCER SCREENING: ICD-10-CM

## 2024-04-05 LAB
ALBUMIN SERPL BCP-MCNC: 4.4 G/DL (ref 3.5–5)
ALP SERPL-CCNC: 83 U/L (ref 34–104)
ALT SERPL W P-5'-P-CCNC: 31 U/L (ref 7–52)
ANION GAP SERPL CALCULATED.3IONS-SCNC: 9 MMOL/L (ref 4–13)
AST SERPL W P-5'-P-CCNC: 22 U/L (ref 13–39)
BASOPHILS # BLD AUTO: 0.06 THOUSANDS/ÂΜL (ref 0–0.1)
BASOPHILS NFR BLD AUTO: 1 % (ref 0–1)
BILIRUB SERPL-MCNC: 0.63 MG/DL (ref 0.2–1)
BUN SERPL-MCNC: 16 MG/DL (ref 5–25)
CALCIUM SERPL-MCNC: 9.2 MG/DL (ref 8.4–10.2)
CHLORIDE SERPL-SCNC: 104 MMOL/L (ref 96–108)
CHOLEST SERPL-MCNC: 172 MG/DL
CO2 SERPL-SCNC: 25 MMOL/L (ref 21–32)
CREAT SERPL-MCNC: 1.03 MG/DL (ref 0.6–1.3)
CRP SERPL QL: 1.5 MG/L
EOSINOPHIL # BLD AUTO: 0.13 THOUSAND/ÂΜL (ref 0–0.61)
EOSINOPHIL NFR BLD AUTO: 2 % (ref 0–6)
ERYTHROCYTE [DISTWIDTH] IN BLOOD BY AUTOMATED COUNT: 12.6 % (ref 11.6–15.1)
ERYTHROCYTE [SEDIMENTATION RATE] IN BLOOD: 15 MM/HOUR (ref 0–19)
EST. AVERAGE GLUCOSE BLD GHB EST-MCNC: 108 MG/DL
GFR SERPL CREATININE-BSD FRML MDRD: 80 ML/MIN/1.73SQ M
GLUCOSE P FAST SERPL-MCNC: 85 MG/DL (ref 65–99)
HBA1C MFR BLD: 5.4 %
HCT VFR BLD AUTO: 50.7 % (ref 36.5–49.3)
HDLC SERPL-MCNC: 45 MG/DL
HGB BLD-MCNC: 16.8 G/DL (ref 12–17)
IMM GRANULOCYTES # BLD AUTO: 0.06 THOUSAND/UL (ref 0–0.2)
IMM GRANULOCYTES NFR BLD AUTO: 1 % (ref 0–2)
LDLC SERPL CALC-MCNC: 107 MG/DL (ref 0–100)
LYMPHOCYTES # BLD AUTO: 1.96 THOUSANDS/ÂΜL (ref 0.6–4.47)
LYMPHOCYTES NFR BLD AUTO: 23 % (ref 14–44)
MCH RBC QN AUTO: 28.9 PG (ref 26.8–34.3)
MCHC RBC AUTO-ENTMCNC: 33.1 G/DL (ref 31.4–37.4)
MCV RBC AUTO: 87 FL (ref 82–98)
MONOCYTES # BLD AUTO: 0.7 THOUSAND/ÂΜL (ref 0.17–1.22)
MONOCYTES NFR BLD AUTO: 8 % (ref 4–12)
NEUTROPHILS # BLD AUTO: 5.67 THOUSANDS/ÂΜL (ref 1.85–7.62)
NEUTS SEG NFR BLD AUTO: 65 % (ref 43–75)
NRBC BLD AUTO-RTO: 0 /100 WBCS
PLATELET # BLD AUTO: 325 THOUSANDS/UL (ref 149–390)
PMV BLD AUTO: 9.3 FL (ref 8.9–12.7)
POTASSIUM SERPL-SCNC: 4.6 MMOL/L (ref 3.5–5.3)
PROT SERPL-MCNC: 7.1 G/DL (ref 6.4–8.4)
PSA SERPL-MCNC: 2.04 NG/ML (ref 0–4)
RBC # BLD AUTO: 5.81 MILLION/UL (ref 3.88–5.62)
SODIUM SERPL-SCNC: 138 MMOL/L (ref 135–147)
TRIGL SERPL-MCNC: 98 MG/DL
TSH SERPL DL<=0.05 MIU/L-ACNC: 1.86 UIU/ML (ref 0.45–4.5)
WBC # BLD AUTO: 8.58 THOUSAND/UL (ref 4.31–10.16)

## 2024-04-05 PROCEDURE — 80061 LIPID PANEL: CPT

## 2024-04-05 PROCEDURE — 84443 ASSAY THYROID STIM HORMONE: CPT

## 2024-04-05 PROCEDURE — 36415 COLL VENOUS BLD VENIPUNCTURE: CPT

## 2024-04-05 PROCEDURE — 83036 HEMOGLOBIN GLYCOSYLATED A1C: CPT

## 2024-04-05 PROCEDURE — G0103 PSA SCREENING: HCPCS

## 2024-04-05 PROCEDURE — 86140 C-REACTIVE PROTEIN: CPT

## 2024-04-05 PROCEDURE — 85652 RBC SED RATE AUTOMATED: CPT

## 2024-04-05 PROCEDURE — 80053 COMPREHEN METABOLIC PANEL: CPT

## 2024-04-05 PROCEDURE — 85025 COMPLETE CBC W/AUTO DIFF WBC: CPT

## 2024-04-08 ENCOUNTER — TELEPHONE (OUTPATIENT)
Dept: FAMILY MEDICINE CLINIC | Facility: CLINIC | Age: 59
End: 2024-04-08

## 2024-04-08 NOTE — TELEPHONE ENCOUNTER
----- Message from Isabela Rain MD sent at 4/7/2024 10:20 PM EDT -----  Blood work is showing cholesterol improved.  All the rest of blood work came back normal.

## 2024-07-10 DIAGNOSIS — E78.49 OTHER HYPERLIPIDEMIA: ICD-10-CM

## 2024-07-10 RX ORDER — ROSUVASTATIN CALCIUM 5 MG/1
5 TABLET, COATED ORAL DAILY
Qty: 100 TABLET | Refills: 1 | Status: SHIPPED | OUTPATIENT
Start: 2024-07-10

## 2024-07-26 DIAGNOSIS — I10 ESSENTIAL HYPERTENSION: ICD-10-CM

## 2024-07-26 RX ORDER — LOSARTAN POTASSIUM 100 MG/1
100 TABLET ORAL DAILY
Qty: 90 TABLET | Refills: 1 | Status: SHIPPED | OUTPATIENT
Start: 2024-07-26

## 2024-07-27 DIAGNOSIS — I10 ESSENTIAL HYPERTENSION: ICD-10-CM

## 2024-07-28 RX ORDER — AMLODIPINE BESYLATE 10 MG/1
10 TABLET ORAL DAILY
Qty: 90 TABLET | Refills: 1 | Status: SHIPPED | OUTPATIENT
Start: 2024-07-28

## 2024-09-11 ENCOUNTER — RA CDI HCC (OUTPATIENT)
Dept: OTHER | Facility: HOSPITAL | Age: 59
End: 2024-09-11

## 2024-09-11 ENCOUNTER — OFFICE VISIT (OUTPATIENT)
Dept: FAMILY MEDICINE CLINIC | Facility: CLINIC | Age: 59
End: 2024-09-11
Payer: COMMERCIAL

## 2024-09-11 VITALS
HEART RATE: 89 BPM | TEMPERATURE: 100.1 F | DIASTOLIC BLOOD PRESSURE: 70 MMHG | WEIGHT: 214 LBS | SYSTOLIC BLOOD PRESSURE: 122 MMHG | RESPIRATION RATE: 16 BRPM | HEIGHT: 69 IN | OXYGEN SATURATION: 94 % | BODY MASS INDEX: 31.7 KG/M2

## 2024-09-11 DIAGNOSIS — J01.00 ACUTE NON-RECURRENT MAXILLARY SINUSITIS: Primary | ICD-10-CM

## 2024-09-11 PROCEDURE — 99213 OFFICE O/P EST LOW 20 MIN: CPT | Performed by: NURSE PRACTITIONER

## 2024-09-11 RX ORDER — PREDNISONE 50 MG/1
50 TABLET ORAL DAILY
Qty: 5 TABLET | Refills: 0 | Status: SHIPPED | OUTPATIENT
Start: 2024-09-11 | End: 2024-09-16

## 2024-09-11 RX ORDER — AZITHROMYCIN 250 MG/1
TABLET, FILM COATED ORAL
Qty: 6 TABLET | Refills: 0 | Status: SHIPPED | OUTPATIENT
Start: 2024-09-11 | End: 2024-09-15

## 2024-09-11 NOTE — ASSESSMENT & PLAN NOTE
- Prescriptions sent for Z-skyler and prednisone. Advised of side effects.  - Recommend Flonase.  - Increase oral hydration and use humidifier.   - Contact office if symptoms do not improve.  Orders:    azithromycin (Zithromax) 250 mg tablet; Take 2 tablets (500 mg total) by mouth daily for 1 day, THEN 1 tablet (250 mg total) daily for 4 days.    predniSONE 50 mg tablet; Take 1 tablet (50 mg total) by mouth daily for 5 days

## 2024-09-11 NOTE — PROGRESS NOTES
Ambulatory Visit  Name: Chang Trent      : 1965      MRN: [id number]  Encounter Provider: MAGDY Saldivar  Encounter Date: 2024   Encounter department: [facility]    Assessment & Plan  Acute non-recurrent maxillary sinusitis  - Prescriptions sent for Z-skyler and prednisone. Advised of side effects.  - Recommend Flonase.  - Increase oral hydration and use humidifier.   - Contact office if symptoms do not improve.  Orders:    azithromycin (Zithromax) 250 mg tablet; Take 2 tablets (500 mg total) by mouth daily for 1 day, THEN 1 tablet (250 mg total) daily for 4 days.    predniSONE 50 mg tablet; Take 1 tablet (50 mg total) by mouth daily for 5 days         History of Present Illness     Patient presents to office today with complaints of cough, congestion, headache, and sinus pain/pressure that has been occurring for over a week. Symptoms are getting worse. Was around his wife who was also sick. Has been using Mucinex OTC with no significant improvement. He denies any other concerns or complaints today.         Review of Systems   Constitutional:  Negative for fatigue and fever.   HENT:  Positive for congestion, sinus pressure, sinus pain and sore throat. Negative for trouble swallowing.    Eyes:  Negative for visual disturbance.   Respiratory:  Positive for cough. Negative for shortness of breath.    Cardiovascular:  Negative for chest pain and palpitations.   Gastrointestinal:  Negative for abdominal pain and blood in stool.   Endocrine: Negative for cold intolerance and heat intolerance.   Genitourinary:  Negative for difficulty urinating and dysuria.   Musculoskeletal:  Negative for gait problem.   Skin:  Negative for rash.   Neurological:  Positive for headaches. Negative for dizziness and syncope.   Hematological:  Negative for adenopathy.   Psychiatric/Behavioral:  Negative for behavioral problems.      Past Medical History:   Diagnosis Date    GERD  (gastroesophageal reflux disease)     Hypertension      Past Surgical History:   Procedure Laterality Date    ROTATOR CUFF REPAIR      SHOULDER SURGERY Left      Family History   Problem Relation Age of Onset    Diabetes Mother     Diabetes Brother      Social History     Tobacco Use    Smoking status: Never    Smokeless tobacco: Never   Vaping Use    Vaping status: Never Used   Substance and Sexual Activity    Alcohol use: Yes    Drug use: Never    Sexual activity: Not on file     Current Outpatient Medications on File Prior to Visit   Medication Sig    amLODIPine (NORVASC) 10 mg tablet TAKE 1 TABLET BY MOUTH EVERY DAY    clobetasol (TEMOVATE) 0.05 % cream clobetasol 0.05 % topical cream   APPLY TO AFFECTED AREA TWICE A DAY    esomeprazole (NexIUM) 40 MG capsule Take 1 capsule (40 mg total) by mouth daily before breakfast    losartan (COZAAR) 100 MG tablet TAKE 1 TABLET BY MOUTH EVERY DAY    mupirocin (BACTROBAN) 2 % ointment Apply topically 3 (three) times a day    rosuvastatin (CRESTOR) 5 mg tablet TAKE 1 TABLET (5 MG TOTAL) BY MOUTH DAILY.    sucralfate (CARAFATE) 1 g/10 mL suspension Take 10 mL (1 g total) by mouth 4 (four) times a day    Upadacitinib ER (Rinvoq) 15 MG TB24 Take 1 tablet every day by oral route.    cyclobenzaprine (FLEXERIL) 5 mg tablet Take 1 tablet (5 mg total) by mouth 3 (three) times a day as needed for muscle spasms (Patient not taking: Reported on 3/20/2024)    EPINEPHrine (EpiPen 2-Moo) 0.3 mg/0.3 mL SOAJ Inject 0.3 mL (0.3 mg total) into a muscle once for 1 dose    folic acid (FOLVITE) 1 mg tablet Take 1,000 mcg by mouth daily (Patient not taking: Reported on 3/20/2024)    leflunomide (ARAVA) 10 MG tablet leflunomide 10 mg tablet   TAKE 1 TABLET BY MOUTH EVERY DAY FOR 30 DAYS (Patient not taking: Reported on 7/19/2023)    naproxen (Naprosyn) 500 mg tablet Take 1 tablet (500 mg total) by mouth 2 (two) times a day with meals (Patient not taking: Reported on 3/20/2024)    ondansetron  "(ZOFRAN-ODT) 8 mg disintegrating tablet DISSOLVE 1 TABLET (8 MG TOTAL) IN CHEEK EVERY 8 (EIGHT) HOURS AS NEEDED FOR NAUSEA OR VOMITING. (Patient not taking: Reported on 3/20/2024)    pantoprazole (PROTONIX) 40 mg tablet TAKE 1 TABLET BY MOUTH EVERY DAY BEFORE BREAKFAST (Patient not taking: Reported on 3/20/2024)    scopolamine (TRANSDERM-SCOP) 1 mg/3 days TD 72 hr patch Place 1 patch on the skin over 72 hours every third day     Allergies   Allergen Reactions    Bee Venom Anaphylaxis    Honey Bee Venom Anaphylaxis     Immunization History   Administered Date(s) Administered    COVID-19 J&J ([employer]) vaccine 0.5 mL 06/07/2022    INFLUENZA 11/16/2020    Influenza, injectable, quadrivalent, preservative free 0.5 mL 11/16/2020    Tdap 05/11/2018     Objective     /70 (BP Location: Left arm, Patient Position: Sitting, Cuff Size: Large)   Pulse 89   Temp 100.1 °F (37.8 °C) (Tympanic)   Resp 16   Ht 5' 9\" (1.753 m)   Wt 97.1 kg (214 lb)   SpO2 94%   BMI 31.60 kg/m²     Physical Exam  Vitals and nursing note reviewed.   Constitutional:       General: He is not in acute distress.     Appearance: Normal appearance. He is ill-appearing.   HENT:      Head: Normocephalic and atraumatic.      Right Ear: Tympanic membrane, ear canal and external ear normal.      Left Ear: Tympanic membrane, ear canal and external ear normal.      Nose: Congestion present.      Mouth/Throat:      Mouth: Mucous membranes are moist.   Eyes:      Conjunctiva/sclera: Conjunctivae normal.   Cardiovascular:      Rate and Rhythm: Normal rate and regular rhythm.      Heart sounds: Normal heart sounds.   Pulmonary:      Effort: Pulmonary effort is normal.      Breath sounds: Normal breath sounds.   Musculoskeletal:         General: Normal range of motion.      Cervical back: Normal range of motion.   Skin:     General: Skin is warm and dry.   Neurological:      Mental Status: He is alert and oriented to person, place, and time.   Psychiatric:   "       Mood and Affect: Mood normal.         Behavior: Behavior normal.

## 2024-09-20 ENCOUNTER — APPOINTMENT (OUTPATIENT)
Dept: LAB | Age: 59
End: 2024-09-20
Payer: COMMERCIAL

## 2024-09-20 ENCOUNTER — OFFICE VISIT (OUTPATIENT)
Dept: FAMILY MEDICINE CLINIC | Facility: CLINIC | Age: 59
End: 2024-09-20
Payer: COMMERCIAL

## 2024-09-20 VITALS
OXYGEN SATURATION: 97 % | BODY MASS INDEX: 32.44 KG/M2 | HEART RATE: 82 BPM | TEMPERATURE: 97.2 F | RESPIRATION RATE: 16 BRPM | HEIGHT: 69 IN | DIASTOLIC BLOOD PRESSURE: 84 MMHG | SYSTOLIC BLOOD PRESSURE: 124 MMHG | WEIGHT: 219 LBS

## 2024-09-20 DIAGNOSIS — G47.33 SEVERE OBSTRUCTIVE SLEEP APNEA: ICD-10-CM

## 2024-09-20 DIAGNOSIS — I10 ESSENTIAL HYPERTENSION: ICD-10-CM

## 2024-09-20 DIAGNOSIS — R73.9 HYPERGLYCEMIA: ICD-10-CM

## 2024-09-20 DIAGNOSIS — E78.49 OTHER HYPERLIPIDEMIA: ICD-10-CM

## 2024-09-20 DIAGNOSIS — E78.49 OTHER HYPERLIPIDEMIA: Primary | ICD-10-CM

## 2024-09-20 DIAGNOSIS — K22.70 BARRETT'S ESOPHAGUS WITHOUT DYSPLASIA: ICD-10-CM

## 2024-09-20 DIAGNOSIS — Z87.39 HISTORY OF RHEUMATOID ARTHRITIS: ICD-10-CM

## 2024-09-20 DIAGNOSIS — M06.89 OTHER SPECIFIED RHEUMATOID ARTHRITIS, MULTIPLE SITES (HCC): ICD-10-CM

## 2024-09-20 DIAGNOSIS — Z79.899 ENCOUNTER FOR LONG-TERM (CURRENT) USE OF OTHER MEDICATIONS: ICD-10-CM

## 2024-09-20 LAB
ALBUMIN SERPL BCG-MCNC: 4.2 G/DL (ref 3.5–5)
ALP SERPL-CCNC: 81 U/L (ref 34–104)
ALT SERPL W P-5'-P-CCNC: 51 U/L (ref 7–52)
ANION GAP SERPL CALCULATED.3IONS-SCNC: 7 MMOL/L (ref 4–13)
AST SERPL W P-5'-P-CCNC: 28 U/L (ref 13–39)
BASOPHILS # BLD MANUAL: 0 THOUSAND/UL (ref 0–0.1)
BASOPHILS NFR MAR MANUAL: 0 % (ref 0–1)
BILIRUB SERPL-MCNC: 0.55 MG/DL (ref 0.2–1)
BUN SERPL-MCNC: 20 MG/DL (ref 5–25)
CALCIUM SERPL-MCNC: 9 MG/DL (ref 8.4–10.2)
CHLORIDE SERPL-SCNC: 103 MMOL/L (ref 96–108)
CHOLEST SERPL-MCNC: 187 MG/DL
CO2 SERPL-SCNC: 27 MMOL/L (ref 21–32)
CREAT SERPL-MCNC: 0.96 MG/DL (ref 0.6–1.3)
CRP SERPL QL: 1.9 MG/L
EOSINOPHIL # BLD MANUAL: 0.23 THOUSAND/UL (ref 0–0.4)
EOSINOPHIL NFR BLD MANUAL: 3 % (ref 0–6)
ERYTHROCYTE [DISTWIDTH] IN BLOOD BY AUTOMATED COUNT: 12.4 % (ref 11.6–15.1)
ERYTHROCYTE [SEDIMENTATION RATE] IN BLOOD: 19 MM/HOUR (ref 0–19)
GFR SERPL CREATININE-BSD FRML MDRD: 87 ML/MIN/1.73SQ M
GLUCOSE P FAST SERPL-MCNC: 89 MG/DL (ref 65–99)
HCT VFR BLD AUTO: 45.9 % (ref 36.5–49.3)
HDLC SERPL-MCNC: 43 MG/DL
HGB BLD-MCNC: 15.7 G/DL (ref 12–17)
LDLC SERPL CALC-MCNC: 120 MG/DL (ref 0–100)
LYMPHOCYTES # BLD AUTO: 14 % (ref 14–44)
LYMPHOCYTES # BLD AUTO: 2.11 THOUSAND/UL (ref 0.6–4.47)
MCH RBC QN AUTO: 29.6 PG (ref 26.8–34.3)
MCHC RBC AUTO-ENTMCNC: 34.2 G/DL (ref 31.4–37.4)
MCV RBC AUTO: 86 FL (ref 82–98)
METAMYELOCYTE ABSOLUTE CT: 0.08 THOUSAND/UL (ref 0–0.1)
METAMYELOCYTES NFR BLD MANUAL: 1 % (ref 0–1)
MONOCYTES # BLD AUTO: 0.23 THOUSAND/UL (ref 0–1.22)
MONOCYTES NFR BLD: 3 % (ref 4–12)
NEUTROPHILS # BLD MANUAL: 5.15 THOUSAND/UL (ref 1.85–7.62)
NEUTS SEG NFR BLD AUTO: 66 % (ref 43–75)
PLATELET # BLD AUTO: 298 THOUSANDS/UL (ref 149–390)
PLATELET BLD QL SMEAR: ADEQUATE
PMV BLD AUTO: 9 FL (ref 8.9–12.7)
POTASSIUM SERPL-SCNC: 4.3 MMOL/L (ref 3.5–5.3)
PROT SERPL-MCNC: 7 G/DL (ref 6.4–8.4)
PSA SERPL-MCNC: 1.64 NG/ML (ref 0–4)
RBC # BLD AUTO: 5.31 MILLION/UL (ref 3.88–5.62)
SODIUM SERPL-SCNC: 137 MMOL/L (ref 135–147)
TRIGL SERPL-MCNC: 119 MG/DL
TSH SERPL DL<=0.05 MIU/L-ACNC: 1.56 UIU/ML (ref 0.45–4.5)
VARIANT LYMPHS # BLD AUTO: 13 %
WBC # BLD AUTO: 7.8 THOUSAND/UL (ref 4.31–10.16)

## 2024-09-20 PROCEDURE — 85027 COMPLETE CBC AUTOMATED: CPT

## 2024-09-20 PROCEDURE — G0103 PSA SCREENING: HCPCS

## 2024-09-20 PROCEDURE — 85652 RBC SED RATE AUTOMATED: CPT

## 2024-09-20 PROCEDURE — 80053 COMPREHEN METABOLIC PANEL: CPT

## 2024-09-20 PROCEDURE — 80061 LIPID PANEL: CPT

## 2024-09-20 PROCEDURE — 99214 OFFICE O/P EST MOD 30 MIN: CPT | Performed by: FAMILY MEDICINE

## 2024-09-20 PROCEDURE — 36415 COLL VENOUS BLD VENIPUNCTURE: CPT

## 2024-09-20 PROCEDURE — 84443 ASSAY THYROID STIM HORMONE: CPT

## 2024-09-20 PROCEDURE — 86140 C-REACTIVE PROTEIN: CPT

## 2024-09-20 PROCEDURE — 85007 BL SMEAR W/DIFF WBC COUNT: CPT

## 2024-09-20 NOTE — ASSESSMENT & PLAN NOTE
Recent blood work showed elevated LDL levels. Discussed low-fat diet and exercise. Pt advised to continue with Crestor 5 mg as prescribed. Repeat blood work has been ordered for follow-up in 6 months.   Orders:    CBC and differential; Future    Comprehensive metabolic panel; Future    Lipid Panel with Direct LDL reflex; Future    TSH, 3rd generation with Free T4 reflex; Future

## 2024-09-20 NOTE — ASSESSMENT & PLAN NOTE
Pt notes no change in symptoms. Pt advised to continue with current medications as prescribed.

## 2024-09-20 NOTE — ASSESSMENT & PLAN NOTE
Well controlled. Pt advised to continue with losartain 100 mg and amlodipine 10 mg as prescribed. Will continue to monitor with follow up in 6 months.   Orders:    CBC and differential; Future    Comprehensive metabolic panel; Future    Lipid Panel with Direct LDL reflex; Future    TSH, 3rd generation with Free T4 reflex; Future

## 2024-09-20 NOTE — PROGRESS NOTES
Ambulatory Visit  Name: Chang Trent      : 1965      MRN: [id number]  Encounter Provider: Isabela Rain MD  Encounter Date: 2024   Encounter department: [facility]    Assessment & Plan  Other hyperlipidemia  Recent blood work showed elevated LDL levels. Discussed low-fat diet and exercise. Pt advised to continue with Crestor 5 mg as prescribed. Repeat blood work has been ordered for follow-up in 6 months.   Orders:    CBC and differential; Future    Comprehensive metabolic panel; Future    Lipid Panel with Direct LDL reflex; Future    TSH, 3rd generation with Free T4 reflex; Future    Essential hypertension  Well controlled. Pt advised to continue with losartain 100 mg and amlodipine 10 mg as prescribed. Will continue to monitor with follow up in 6 months.   Orders:    CBC and differential; Future    Comprehensive metabolic panel; Future    Lipid Panel with Direct LDL reflex; Future    TSH, 3rd generation with Free T4 reflex; Future    History of rheumatoid arthritis  Pt advised to continue with Rinvoq 15 mg as prescribed, and continue follow up with Rheum.          Bashir's esophagus without dysplasia  Pt notes no change in symptoms. Pt advised to continue with current medications as prescribed.          Severe obstructive sleep apnea  Pt has chosen not to use a CPAP machine.               History of Present Illness     JA is a 58 yo male with a PMH of rheumatoid arthritis, HTN, hyperlipidemia, sleep apnea, and Bashir's esophagus, presenting to the office for a 6 month follow-up. Pt presents with no acute complaints today. He denies chest pain, SOB, palpitations, and all other ROS were negative. Pt states he takes all medications as prescribed, and denies any complications. He follows up routinely with Rheum for his RA.         Hyperlipidemia  Pertinent negatives include no chest pain or shortness of breath.   Hypertension  Pertinent negatives include no chest pain,  headaches, palpitations or shortness of breath.     Review of Systems   Constitutional:  Negative for chills and fever.   HENT:  Negative for ear pain, sore throat and trouble swallowing.    Eyes:  Negative for pain and visual disturbance.   Respiratory:  Negative for cough and shortness of breath.    Cardiovascular:  Negative for chest pain and palpitations.   Gastrointestinal:  Negative for abdominal pain, blood in stool, nausea and vomiting.   Endocrine: Negative for cold intolerance and heat intolerance.   Genitourinary:  Negative for difficulty urinating, dysuria and hematuria.   Musculoskeletal:  Negative for arthralgias, back pain and gait problem.   Skin:  Negative for color change and rash.   Neurological:  Negative for dizziness, seizures, syncope and headaches.   Hematological:  Negative for adenopathy.   Psychiatric/Behavioral:  Negative for behavioral problems.    All other systems reviewed and are negative.    Past Medical History:   Diagnosis Date    GERD (gastroesophageal reflux disease)     Hypertension      Past Surgical History:   Procedure Laterality Date    ROTATOR CUFF REPAIR      SHOULDER SURGERY Left      Family History   Problem Relation Age of Onset    Diabetes Mother     Diabetes Brother      Social History     Tobacco Use    Smoking status: Never    Smokeless tobacco: Never   Vaping Use    Vaping status: Never Used   Substance and Sexual Activity    Alcohol use: Yes    Drug use: Never    Sexual activity: Not on file     Current Outpatient Medications on File Prior to Visit   Medication Sig    amLODIPine (NORVASC) 10 mg tablet TAKE 1 TABLET BY MOUTH EVERY DAY    clobetasol (TEMOVATE) 0.05 % cream clobetasol 0.05 % topical cream   APPLY TO AFFECTED AREA TWICE A DAY    esomeprazole (NexIUM) 40 MG capsule Take 1 capsule (40 mg total) by mouth daily before breakfast    losartan (COZAAR) 100 MG tablet TAKE 1 TABLET BY MOUTH EVERY DAY    mupirocin (BACTROBAN) 2 % ointment Apply topically 3  (three) times a day    rosuvastatin (CRESTOR) 5 mg tablet TAKE 1 TABLET (5 MG TOTAL) BY MOUTH DAILY.    sucralfate (CARAFATE) 1 g/10 mL suspension Take 10 mL (1 g total) by mouth 4 (four) times a day    Upadacitinib ER (Rinvoq) 15 MG TB24 Take 1 tablet every day by oral route.    cyclobenzaprine (FLEXERIL) 5 mg tablet Take 1 tablet (5 mg total) by mouth 3 (three) times a day as needed for muscle spasms (Patient not taking: Reported on 3/20/2024)    EPINEPHrine (EpiPen 2-Moo) 0.3 mg/0.3 mL SOAJ Inject 0.3 mL (0.3 mg total) into a muscle once for 1 dose    folic acid (FOLVITE) 1 mg tablet Take 1,000 mcg by mouth daily (Patient not taking: Reported on 3/20/2024)    leflunomide (ARAVA) 10 MG tablet leflunomide 10 mg tablet   TAKE 1 TABLET BY MOUTH EVERY DAY FOR 30 DAYS (Patient not taking: Reported on 7/19/2023)    naproxen (Naprosyn) 500 mg tablet Take 1 tablet (500 mg total) by mouth 2 (two) times a day with meals (Patient not taking: Reported on 3/20/2024)    ondansetron (ZOFRAN-ODT) 8 mg disintegrating tablet DISSOLVE 1 TABLET (8 MG TOTAL) IN CHEEK EVERY 8 (EIGHT) HOURS AS NEEDED FOR NAUSEA OR VOMITING. (Patient not taking: Reported on 3/20/2024)    pantoprazole (PROTONIX) 40 mg tablet TAKE 1 TABLET BY MOUTH EVERY DAY BEFORE BREAKFAST (Patient not taking: Reported on 3/20/2024)    scopolamine (TRANSDERM-SCOP) 1 mg/3 days TD 72 hr patch Place 1 patch on the skin over 72 hours every third day (Patient not taking: Reported on 9/20/2024)     Allergies   Allergen Reactions    Bee Venom Anaphylaxis    Honey Bee Venom Anaphylaxis     Immunization History   Administered Date(s) Administered    COVID-19 [employer] vaccine 0.5 mL 06/07/2022    INFLUENZA 11/16/2020    Influenza, injectable, quadrivalent, preservative free 0.5 mL 11/16/2020    Tdap 05/11/2018     Objective     /84 (BP Location: Left arm, Patient Position: Sitting, Cuff Size: Large)   Pulse 82   Temp (!) 97.2 °F (36.2 °C) (Tympanic)   Resp 16   Ht  "5' 9\" (1.753 m)   Wt 99.3 kg (219 lb)   SpO2 97%   BMI 32.34 kg/m²     Physical Exam  Vitals and nursing note reviewed.   Constitutional:       General: He is not in acute distress.     Appearance: He is well-developed.   HENT:      Head: Normocephalic and atraumatic.      Right Ear: Tympanic membrane, ear canal and external ear normal.      Left Ear: Tympanic membrane, ear canal and external ear normal.      Mouth/Throat:      Mouth: Mucous membranes are moist.      Pharynx: Oropharynx is clear. No oropharyngeal exudate or posterior oropharyngeal erythema.   Eyes:      Conjunctiva/sclera: Conjunctivae normal.   Cardiovascular:      Rate and Rhythm: Normal rate and regular rhythm.      Heart sounds: No murmur heard.  Pulmonary:      Effort: Pulmonary effort is normal. No respiratory distress.      Breath sounds: Normal breath sounds.   Musculoskeletal:         General: No swelling.      Cervical back: Neck supple.      Right lower leg: No edema.      Left lower leg: No edema.   Skin:     General: Skin is warm and dry.      Capillary Refill: Capillary refill takes less than 2 seconds.   Neurological:      Mental Status: He is alert.   Psychiatric:         Mood and Affect: Mood normal.         "

## 2024-10-11 PROBLEM — J01.00 ACUTE NON-RECURRENT MAXILLARY SINUSITIS: Status: RESOLVED | Noted: 2024-09-11 | Resolved: 2024-10-11

## 2025-01-03 DIAGNOSIS — E78.49 OTHER HYPERLIPIDEMIA: ICD-10-CM

## 2025-01-03 DIAGNOSIS — K22.70 BARRETT'S ESOPHAGUS WITHOUT DYSPLASIA: ICD-10-CM

## 2025-01-03 RX ORDER — ROSUVASTATIN CALCIUM 5 MG/1
5 TABLET, COATED ORAL DAILY
Qty: 90 TABLET | Refills: 1 | Status: SHIPPED | OUTPATIENT
Start: 2025-01-03

## 2025-01-03 RX ORDER — ESOMEPRAZOLE MAGNESIUM 40 MG/1
40 CAPSULE, DELAYED RELEASE ORAL
Qty: 30 CAPSULE | Refills: 5 | Status: SHIPPED | OUTPATIENT
Start: 2025-01-03

## 2025-01-04 DIAGNOSIS — I10 ESSENTIAL HYPERTENSION: ICD-10-CM

## 2025-01-05 RX ORDER — LOSARTAN POTASSIUM 100 MG/1
100 TABLET ORAL DAILY
Qty: 90 TABLET | Refills: 1 | Status: SHIPPED | OUTPATIENT
Start: 2025-01-05

## 2025-02-12 ENCOUNTER — NURSE TRIAGE (OUTPATIENT)
Dept: OTHER | Facility: OTHER | Age: 60
End: 2025-02-12

## 2025-02-12 DIAGNOSIS — M25.50 ARTHRALGIA, UNSPECIFIED JOINT: Primary | ICD-10-CM

## 2025-02-12 NOTE — TELEPHONE ENCOUNTER
"Chang was a patient of  Patrizia Campbell MD Rheumatologist with [facility] who prescribed Rinvoq 15 mg daily for him. Dr Campbell has left the area and he no longer has pt relationship with her. He does have rheumatology scheduled in July but if he were to go off this medication his RA symptoms would worsen and they are severe, debilitating, has to stop working. He is not sure what to do.    Advised he contact the office she was at to see if there is a rheumatologist there who would be willing to give him refill. Then follow up with PCP Dr Rain. I will send PCP a message to see if he would be willing to prescribe until Chang can be seen by rheumatology. Will also send message to rheumatology office to see if he can get seen any sooner or placed on a wait list. Advised patient to continue to follow up until a satisfactory plan is made.    Reason for Disposition   Prescription request for new medicine (not a refill)    Answer Assessment - Initial Assessment Questions  1. DRUG NAME: \"What medicine do you need to have refilled?\"        Upadacitinib ER (Rinvoq) 15 MG TB24       Sig: Take 1 tablet every day by oral route.           2. REFILLS REMAINING: \"How many refills are remaining?\" (Note: The label on the medicine or pill bottle will show how many refills are remaining. If there are no refills remaining, then a renewal may be needed.)      0    3. EXPIRATION DATE: \"What is the expiration date?\" (Note: The label states when the prescription will , and thus can no longer be refilled.)      NA    4. PRESCRIBING HCP: \"Who prescribed it?\" Reason: If prescribed by specialist, call should be referred to that group.       Patrizia Campbell MD ([facility] Rheumatologist who has left the area and no longer has pt relationship with him)      5. SYMPTOMS: \"Do you have any symptoms?\"      Not right now but if he goes off this medication it is extremely debilitating for him    Protocols used: Medication Refill and Renewal " Call-Adult-AH

## 2025-02-13 NOTE — TELEPHONE ENCOUNTER
"Chang was a patient of  Patrizia Campbell MD Rheumatologist with [facility] who prescribed Rinvoq 15 mg daily for him. Dr Campbell has left the area and he no longer has pt relationship with her. He does have rheumatology scheduled in July but if he were to go off this medication his RA symptoms would worsen and they are severe, debilitating, has to stop working. He is not sure what to do.    He was triaged for med refill and advised to contact his PCP to see if he can be refilled through him until pt establishes care w [facility] rheumatology, but also advised he see if he can be seen sooner with us. Please reach out to patient and see if he can be seen sooner or placed on a waitlist. Thanks!    Reason for Disposition   [1] Caller requesting NON-URGENT health information AND [2] PCP's office is the best resource    Answer Assessment - Initial Assessment Questions  1. REASON FOR CALL: \"What is the main reason for your call?\" or \"How can I best help you?\"      Needs to get in with rheum asap for medication continuity    2. SYMPTOMS : \"Do you have any symptoms?\"       Not yet but would if he had to d/c meds      Pt already triaged for this problem which was routed to his PCP    Protocols used: Information Only Call - No Triage-Adult-    "

## 2025-02-17 RX ORDER — UPADACITINIB 15 MG/1
15 TABLET, EXTENDED RELEASE ORAL DAILY
Qty: 30 TABLET | Refills: 1 | Status: SHIPPED | OUTPATIENT
Start: 2025-02-17

## 2025-02-17 NOTE — TELEPHONE ENCOUNTER
Pt would like to know if you will prescribe his Rinvoq 15 mg daily. His recent rheumatologist has moved from the area and does not have appointment with new rheumatologist until July and wants to avoid his RA from flaring.  Pt was last seen 10/12/24.  Would you like him to come in or can it be prescribed?  Please advise

## 2025-02-25 ENCOUNTER — OFFICE VISIT (OUTPATIENT)
Age: 60
End: 2025-02-25
Payer: COMMERCIAL

## 2025-02-25 VITALS
WEIGHT: 223 LBS | HEART RATE: 101 BPM | HEIGHT: 69 IN | OXYGEN SATURATION: 96 % | DIASTOLIC BLOOD PRESSURE: 82 MMHG | BODY MASS INDEX: 33.03 KG/M2 | SYSTOLIC BLOOD PRESSURE: 132 MMHG

## 2025-02-25 DIAGNOSIS — M25.50 ARTHRALGIA, UNSPECIFIED JOINT: ICD-10-CM

## 2025-02-25 DIAGNOSIS — Z11.1 TUBERCULOSIS SCREENING: ICD-10-CM

## 2025-02-25 DIAGNOSIS — M05.9 SEROPOSITIVE RHEUMATOID ARTHRITIS (HCC): Primary | ICD-10-CM

## 2025-02-25 DIAGNOSIS — Z11.59 NEED FOR HEPATITIS C SCREENING TEST: ICD-10-CM

## 2025-02-25 DIAGNOSIS — Z11.59 NEED FOR HEPATITIS B SCREENING TEST: ICD-10-CM

## 2025-02-25 DIAGNOSIS — Z79.899 HIGH RISK MEDICATION USE: ICD-10-CM

## 2025-02-25 PROCEDURE — 99205 OFFICE O/P NEW HI 60 MIN: CPT | Performed by: STUDENT IN AN ORGANIZED HEALTH CARE EDUCATION/TRAINING PROGRAM

## 2025-02-25 RX ORDER — UPADACITINIB 15 MG/1
15 TABLET, EXTENDED RELEASE ORAL DAILY
Qty: 30 TABLET | Refills: 4 | Status: SHIPPED | OUTPATIENT
Start: 2025-02-25 | End: 2025-02-26 | Stop reason: SDUPTHER

## 2025-02-25 NOTE — PATIENT INSTRUCTIONS
While on your prescribed rheumatologic medication(s) Rinvoq (upadacitinib): you must STOP the medication if you fall ill (ex: a viral infection, bacterial infection) and not restart it until your illness is resolved and/or you are finished with any antibiotics/antivirals/antifungals that are prescribed for you, whichever happens last.    While on the medication(s), if you are to get a vaccine, you may have to STOP the medication before and after your vaccination. See below for how long to stop your medication according to how often you take it.    LIVE ATTENUATED VACCINES (ex: MMR, rotavirus, smallpox, chickenpox, yellow fever)    Hold before vaccine Hold after vaccine   Steroids 4 weeks 4 weeks   Methotrexate  Azathioprine  Sulfasalazine  Leflunomide  Mycophenolate  Cyclophosphamide (oral) 4 weeks    Calcineurin inhibitors such as:  Tacrolimus  Cyclosporine  Voclosporin 4 weeks    ALMA DELIA inhibitors such as:  Tofacitanib  Upadacitinib  Ruxolitinib  Baricitinib 1 week    TNF inhibitors such as:  Adalimumab and biosimilars  Infliximab  Etanercept  Golimumab  Certolizumab 1 dosing interval (ex: for an every 4 week medication, hold for 4 weeks)    IL-17 inhibitors such as:  Ixekizumab  Secukinumab 1 dosing interval (ex: for an every 4 week medication, hold for 4 weeks)    IL-12/23 inhibitors such as:  Ustekinumab 1 dosing interval (ex: for an every 4 week medication, hold for 4 weeks)    IL-23 inhibitors such as:  Guselkumab  Rizankizumab 1 dosing interval (ex: for an every 4 week medication, hold for 4 weeks)    Belimumab 1 dosing interval (ex: for an every 4 week medication, hold for 4 weeks)    IL-6 inhibitors such as:  Tocilizumab  Sarilumab 1 dosing interval (ex: for an every 4 week medication, hold for 4 weeks)    IL-1 inhibitors such as:  Anakinra  Rilonacept  Canakinumab 1 dosing interval (ex: for an every 4 week medication, hold for 4 weeks)    Abatacept 1 dosing interval (ex: for an every 4 week medication, hold  for 4 weeks)    Cyclophosphamide (IV) 1 dosing interval (ex: for an every 4 week medication, hold for 4 weeks)    Rituximab 6 months    IVI-400 mg/kg  1000 mg/kg  2000 mg/kg   8 months  10 months  11 months      COVID-19 VACCINE    Instructions   Abatacept (IV) Time vaccine so it is given 2 weeks before next scheduled abatacept dose, then receive abatacept as scheduled   Abatacept (SQ) Delay abatacept dose for 2 weeks after vaccine   Belimumab Delay belimumab dose for 2 weeks after vaccine   Cyclophosphamide (IV) Time cyclophosphamide administration so that it will occur 1 week after each vaccine dose   Hydroxychloroquine No changes to hydroxychloroquine timing or vaccine timing   IVIG No changes to IVIG timing or vaccine timing   Rituximab Time vaccine so that it is given 2 weeks before the next scheduled rituximab dose, then receive rituximab as scheduled   All others Delay rheumatologic medication dose for 2 weeks after vaccine, if disease activity allows     INFLUENZA VACCINE    Hold before vaccine Hold after vaccine   Methotrexate 1 week 2 weeks if able   Rituximab  n/a 2 weeks   All others CONTINUE, DO NOT HOLD CONTINUE, DO NOT HOLD     ALL OTHER VACCINES    Hold before vaccine Hold after vaccine   Methotrexate CONTINUE, DO NOT HOLD CONTINUE, DO NOT HOLD   Rituximab  n/a Time vaccination for when next dose is due, then give rituximab at least 2 weeks after vaccine   All others CONTINUE, DO NOT HOLD CONTINUE, DO NOT HOLD     These instructions are consistent with the recommendations set forth by the American College of Rheumatology (ACR).

## 2025-02-25 NOTE — PROGRESS NOTES
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  He used to follow Dr. Campbell. At this time medication is working well for him and he does not have any complaints of flareups of his joint pains, very mild tenderness in 1 or 2 PIPs today. CDAI score about 3 today.  Plan:  Will get baseline XR of hands and feet  Repeat RF and CCP for our records  Labs for monitoring every 3 mo  RTC 3 mo  Orders:    CBC and differential; Standing    Comprehensive metabolic panel; Standing    Sedimentation rate, automated; Future    C-reactive protein; Future    XR foot 3+ vw left; Future    XR foot 3+ vw right; Future    XR hand 3+ vw left; Future    XR hand 3+ vw right; Future    RF Screen w/ Reflex to Titer; Future    Cyclic citrul peptide antibody, IgG; Future    Need for hepatitis B screening test    Orders:    Hepatitis B core antibody, total; Future    Hepatitis B surface antibody    Hepatitis B surface antigen; Future    Need for hepatitis C screening test    Orders:    Hepatitis C antibody; Future    Tuberculosis screening    Orders:    Quantiferon TB Gold Plus Assay; Future    High risk medication use    Orders:    CBC and differential; Standing    Comprehensive metabolic panel; Standing    Sedimentation rate, automated; Future    C-reactive protein; Future    Quantiferon TB Gold Plus Assay; Future    Hepatitis B core antibody, total; Future    Hepatitis B surface antibody    Hepatitis B surface antigen; Future    Hepatitis C antibody; Future    Lipid panel; Future    Arthralgia, unspecified joint    Orders:    Upadacitinib ER (Rinvoq) 15 MG TB24; Take 15 mg by mouth daily    Uric acid;  "Future        History of Present Illness   HPI  Chang Trent is a 59 y.o. male who presents for evaluation for double seropositive RA.     Rheumatic disease summary  After Covid in 2020 he started having joint pain in the hands bilaterally as well as fatigue, jaw pain etc.   Labs showed + CCP >340 and +RF 80   Pt used to see Dr. Campbell at [facility]  He had been on and off steroids over a few years for his arthralgias.  He had tried MTX in the past but could not tolerate GI side effects  Denied any hx of psoriasis, dactylitis, etc.   He had tried Enbrel in the past and it was not very helpful.   He was taking Rinvoq 15 mg daily as last prescribed medication    After Dr. Campbell left he had not been able to get refill on his Rinvoq, so his PCP took over temporarily. No gap in medications.     His hands are the main place that is affected when he gets a flare, but he has not had a flare of his RA while on the Rinvoq from what he can tell. He takes Advil for flares and it does help.     He feels that the plantar side of his right foot gets swollen from MTP area sometimes (not-painful), these episodes last 3 weeks and has been coming and going every 3-4 months for the past 2 years. He has had steroid injections in his feet before.    Has noticed some increase in minor infections like common cold since being on the Rinvoq.                Review of Systems   Constitutional: Negative.    HENT: Negative.     Eyes: Negative.    Respiratory: Negative.     Cardiovascular: Negative.    Gastrointestinal: Negative.    Musculoskeletal:  Positive for arthralgias and back pain. Negative for joint swelling.   Skin: Negative.           Objective   /82   Pulse 101   Ht 5' 9\" (1.753 m)   Wt 101 kg (223 lb)   SpO2 96%   BMI 32.93 kg/m²      Physical Exam  Constitutional:       Appearance: Normal appearance.   HENT:      Head: Normocephalic.   Eyes:      Extraocular Movements: Extraocular movements intact.      Pupils: " Pupils are equal, round, and reactive to light.   Cardiovascular:      Pulses: Normal pulses.      Heart sounds: Normal heart sounds.   Pulmonary:      Effort: Pulmonary effort is normal.      Breath sounds: Normal breath sounds.   Musculoskeletal:      Comments: MSK Exam  The following areas have been examined today and do not show any signs of synovitis, tenderness, or restricted ROM unless otherwise specified below:  Neck  Shoulders  Back  Elbows  Wrists  Hands - some mild tenderness in 4th-5th PIP of R hand  Hips  Knees  Ankles  Feet    Neurological:      Mental Status: He is alert and oriented to person, place, and time.

## 2025-02-26 DIAGNOSIS — M25.50 ARTHRALGIA, UNSPECIFIED JOINT: ICD-10-CM

## 2025-02-26 NOTE — TELEPHONE ENCOUNTER
Marielena from [facility] is calling to ask if we can send a script over for the patient's Rinvoq 15 mg. Patient did not pick the medication up at [facility] as originally ordered. Marielena states it must be ordered through [facility]. She provided me with their information. Marielena states the patient has 7 days worth of medication left.     [address] [place] [address]  Phone: [phone]  Fax: [phone]      They are open 24/7 if further questions are needed.     Please advise.

## 2025-02-27 RX ORDER — UPADACITINIB 15 MG/1
15 TABLET, EXTENDED RELEASE ORAL DAILY
Qty: 30 TABLET | Refills: 4 | Status: SHIPPED | OUTPATIENT
Start: 2025-02-27

## 2025-03-08 ENCOUNTER — APPOINTMENT (OUTPATIENT)
Dept: LAB | Facility: IMAGING CENTER | Age: 60
End: 2025-03-08
Payer: COMMERCIAL

## 2025-03-08 DIAGNOSIS — M25.50 ARTHRALGIA, UNSPECIFIED JOINT: ICD-10-CM

## 2025-03-08 DIAGNOSIS — Z11.59 NEED FOR HEPATITIS B SCREENING TEST: ICD-10-CM

## 2025-03-08 DIAGNOSIS — Z79.899 HIGH RISK MEDICATION USE: ICD-10-CM

## 2025-03-08 DIAGNOSIS — M05.9 SEROPOSITIVE RHEUMATOID ARTHRITIS (HCC): ICD-10-CM

## 2025-03-08 DIAGNOSIS — E78.49 OTHER HYPERLIPIDEMIA: ICD-10-CM

## 2025-03-08 DIAGNOSIS — Z11.59 NEED FOR HEPATITIS C SCREENING TEST: ICD-10-CM

## 2025-03-08 DIAGNOSIS — I10 ESSENTIAL HYPERTENSION: ICD-10-CM

## 2025-03-08 DIAGNOSIS — Z11.1 TUBERCULOSIS SCREENING: ICD-10-CM

## 2025-03-08 LAB
ALBUMIN SERPL BCG-MCNC: 4.3 G/DL (ref 3.5–5)
ALP SERPL-CCNC: 85 U/L (ref 34–104)
ALT SERPL W P-5'-P-CCNC: 31 U/L (ref 7–52)
ANION GAP SERPL CALCULATED.3IONS-SCNC: 8 MMOL/L (ref 4–13)
AST SERPL W P-5'-P-CCNC: 19 U/L (ref 13–39)
BASOPHILS # BLD AUTO: 0.04 THOUSANDS/ÂΜL (ref 0–0.1)
BASOPHILS NFR BLD AUTO: 1 % (ref 0–1)
BILIRUB SERPL-MCNC: 0.72 MG/DL (ref 0.2–1)
BUN SERPL-MCNC: 17 MG/DL (ref 5–25)
CALCIUM SERPL-MCNC: 9.2 MG/DL (ref 8.4–10.2)
CHLORIDE SERPL-SCNC: 104 MMOL/L (ref 96–108)
CHOLEST SERPL-MCNC: 176 MG/DL (ref ?–200)
CO2 SERPL-SCNC: 27 MMOL/L (ref 21–32)
CREAT SERPL-MCNC: 0.96 MG/DL (ref 0.6–1.3)
CRP SERPL QL: <1 MG/L
EOSINOPHIL # BLD AUTO: 0.12 THOUSAND/ÂΜL (ref 0–0.61)
EOSINOPHIL NFR BLD AUTO: 2 % (ref 0–6)
ERYTHROCYTE [DISTWIDTH] IN BLOOD BY AUTOMATED COUNT: 12.5 % (ref 11.6–15.1)
ERYTHROCYTE [SEDIMENTATION RATE] IN BLOOD: 15 MM/HOUR (ref 0–19)
GFR SERPL CREATININE-BSD FRML MDRD: 87 ML/MIN/1.73SQ M
GLUCOSE P FAST SERPL-MCNC: 94 MG/DL (ref 65–99)
HCT VFR BLD AUTO: 47.7 % (ref 36.5–49.3)
HDLC SERPL-MCNC: 41 MG/DL
HGB BLD-MCNC: 16 G/DL (ref 12–17)
IMM GRANULOCYTES # BLD AUTO: 0.05 THOUSAND/UL (ref 0–0.2)
IMM GRANULOCYTES NFR BLD AUTO: 1 % (ref 0–2)
LDLC SERPL CALC-MCNC: 111 MG/DL (ref 0–100)
LYMPHOCYTES # BLD AUTO: 1.42 THOUSANDS/ÂΜL (ref 0.6–4.47)
LYMPHOCYTES NFR BLD AUTO: 20 % (ref 14–44)
MCH RBC QN AUTO: 29.7 PG (ref 26.8–34.3)
MCHC RBC AUTO-ENTMCNC: 33.5 G/DL (ref 31.4–37.4)
MCV RBC AUTO: 89 FL (ref 82–98)
MONOCYTES # BLD AUTO: 0.57 THOUSAND/ÂΜL (ref 0.17–1.22)
MONOCYTES NFR BLD AUTO: 8 % (ref 4–12)
NEUTROPHILS # BLD AUTO: 5.06 THOUSANDS/ÂΜL (ref 1.85–7.62)
NEUTS SEG NFR BLD AUTO: 68 % (ref 43–75)
NRBC BLD AUTO-RTO: 0 /100 WBCS
PLATELET # BLD AUTO: 293 THOUSANDS/UL (ref 149–390)
PMV BLD AUTO: 9.6 FL (ref 8.9–12.7)
POTASSIUM SERPL-SCNC: 4.1 MMOL/L (ref 3.5–5.3)
PROT SERPL-MCNC: 6.8 G/DL (ref 6.4–8.4)
RBC # BLD AUTO: 5.38 MILLION/UL (ref 3.88–5.62)
SODIUM SERPL-SCNC: 139 MMOL/L (ref 135–147)
TRIGL SERPL-MCNC: 121 MG/DL (ref ?–150)
TSH SERPL DL<=0.05 MIU/L-ACNC: 1.64 UIU/ML (ref 0.45–4.5)
URATE SERPL-MCNC: 4.8 MG/DL (ref 3.5–8.5)
WBC # BLD AUTO: 7.26 THOUSAND/UL (ref 4.31–10.16)

## 2025-03-08 PROCEDURE — 86704 HEP B CORE ANTIBODY TOTAL: CPT

## 2025-03-08 PROCEDURE — 80061 LIPID PANEL: CPT

## 2025-03-08 PROCEDURE — 87340 HEPATITIS B SURFACE AG IA: CPT

## 2025-03-08 PROCEDURE — 85652 RBC SED RATE AUTOMATED: CPT

## 2025-03-08 PROCEDURE — 86706 HEP B SURFACE ANTIBODY: CPT

## 2025-03-08 PROCEDURE — 84443 ASSAY THYROID STIM HORMONE: CPT

## 2025-03-08 PROCEDURE — 86480 TB TEST CELL IMMUN MEASURE: CPT

## 2025-03-08 PROCEDURE — 85025 COMPLETE CBC W/AUTO DIFF WBC: CPT

## 2025-03-08 PROCEDURE — 86430 RHEUMATOID FACTOR TEST QUAL: CPT

## 2025-03-08 PROCEDURE — 86200 CCP ANTIBODY: CPT

## 2025-03-08 PROCEDURE — 84550 ASSAY OF BLOOD/URIC ACID: CPT

## 2025-03-08 PROCEDURE — 80053 COMPREHEN METABOLIC PANEL: CPT

## 2025-03-08 PROCEDURE — 86803 HEPATITIS C AB TEST: CPT

## 2025-03-08 PROCEDURE — 36415 COLL VENOUS BLD VENIPUNCTURE: CPT

## 2025-03-08 PROCEDURE — 86140 C-REACTIVE PROTEIN: CPT

## 2025-03-09 LAB
CCP AB SER IA-ACNC: >340 (ref ?–10)
HBV CORE AB SER QL: NORMAL
HBV SURFACE AB SER-ACNC: <3 MIU/ML
HBV SURFACE AG SER QL: NORMAL
HCV AB SER QL: NORMAL
RHEUMATOID FACT SER QL LA: NEGATIVE

## 2025-03-10 LAB
GAMMA INTERFERON BACKGROUND BLD IA-ACNC: 0.04 IU/ML
M TB IFN-G BLD-IMP: NEGATIVE
M TB IFN-G CD4+ BCKGRND COR BLD-ACNC: 0 IU/ML
M TB IFN-G CD4+ BCKGRND COR BLD-ACNC: 0.01 IU/ML
MITOGEN IGNF BCKGRD COR BLD-ACNC: 7.92 IU/ML

## 2025-03-12 ENCOUNTER — APPOINTMENT (OUTPATIENT)
Dept: RADIOLOGY | Age: 60
End: 2025-03-12
Payer: COMMERCIAL

## 2025-03-12 DIAGNOSIS — M05.9 SEROPOSITIVE RHEUMATOID ARTHRITIS (HCC): ICD-10-CM

## 2025-03-12 PROCEDURE — 73630 X-RAY EXAM OF FOOT: CPT

## 2025-03-12 PROCEDURE — 73130 X-RAY EXAM OF HAND: CPT

## 2025-03-13 ENCOUNTER — RESULTS FOLLOW-UP (OUTPATIENT)
Dept: FAMILY MEDICINE CLINIC | Facility: CLINIC | Age: 60
End: 2025-03-13

## 2025-03-28 ENCOUNTER — OFFICE VISIT (OUTPATIENT)
Dept: FAMILY MEDICINE CLINIC | Facility: CLINIC | Age: 60
End: 2025-03-28
Payer: COMMERCIAL

## 2025-03-28 VITALS
SYSTOLIC BLOOD PRESSURE: 124 MMHG | HEART RATE: 79 BPM | TEMPERATURE: 97.4 F | OXYGEN SATURATION: 99 % | RESPIRATION RATE: 16 BRPM | WEIGHT: 224.4 LBS | DIASTOLIC BLOOD PRESSURE: 88 MMHG | HEIGHT: 69 IN | BODY MASS INDEX: 33.24 KG/M2

## 2025-03-28 DIAGNOSIS — K21.9 GASTROESOPHAGEAL REFLUX DISEASE, UNSPECIFIED WHETHER ESOPHAGITIS PRESENT: ICD-10-CM

## 2025-03-28 DIAGNOSIS — G47.33 SEVERE OBSTRUCTIVE SLEEP APNEA: ICD-10-CM

## 2025-03-28 DIAGNOSIS — Z00.01 ABNORMAL WELLNESS EXAM: ICD-10-CM

## 2025-03-28 DIAGNOSIS — R73.9 HYPERGLYCEMIA: ICD-10-CM

## 2025-03-28 DIAGNOSIS — I10 ESSENTIAL HYPERTENSION: ICD-10-CM

## 2025-03-28 DIAGNOSIS — Z12.5 PROSTATE CANCER SCREENING: ICD-10-CM

## 2025-03-28 DIAGNOSIS — E78.49 OTHER HYPERLIPIDEMIA: Primary | ICD-10-CM

## 2025-03-28 DIAGNOSIS — K22.70 BARRETT'S ESOPHAGUS WITHOUT DYSPLASIA: ICD-10-CM

## 2025-03-28 PROCEDURE — 99396 PREV VISIT EST AGE 40-64: CPT | Performed by: FAMILY MEDICINE

## 2025-03-28 PROCEDURE — 99214 OFFICE O/P EST MOD 30 MIN: CPT | Performed by: FAMILY MEDICINE

## 2025-03-28 NOTE — PROGRESS NOTES
Name: Chang Trent      : 1965      MRN: [id number]  Encounter Provider: Isabela Rain MD  Encounter Date: 3/28/2025   Encounter department: [facility]  :  Assessment & Plan  Other hyperlipidemia  Recent blood work showed elevated LDL levels. Discussed low-fat diet and exercise. Pt advised to continue with Crestor 5 mg as prescribed. Repeat blood work has been ordered for follow-up in 6 months.   Orders:  •  CBC and differential; Future  •  Comprehensive metabolic panel; Future  •  Lipid Panel with Direct LDL reflex; Future  •  TSH, 3rd generation with Free T4 reflex; Future    Essential hypertension  Well controlled. Pt advised to continue with losartain 100 mg and amlodipine 10 mg as prescribed. Will continue to monitor with follow up in 6 months.   Orders:  •  CBC and differential; Future  •  Comprehensive metabolic panel; Future  •  Lipid Panel with Direct LDL reflex; Future  •  TSH, 3rd generation with Free T4 reflex; Future    Hyperglycemia  Improved.  Continue to follow low-carb diet and regular exercise.  Continue to monitor.  Orders:  •  Hemoglobin A1C; Future    Prostate cancer screening    Orders:  •  PSA, Total Screen; Future    Bashir's esophagus without dysplasia  Pt notes no change in symptoms. Pt advised to continue with current medications as prescribed.          Gastroesophageal reflux disease, unspecified whether esophagitis present  Continue nexium        Severe obstructive sleep apnea  Pt has chosen not to use a CPAP machine.          Abnormal wellness exam  It was discussed about immunizations, diet, exercise and safety measures.              History of Present Illness   He is here today for follow-up multiple medical problems.  Has been taking his medication.  Denies any side effect.  He had blood work done recently showed slightly elevated LDL otherwise blood work came back stable.  He has history of sleep apnea but he does not use CPAP.  He continues  "to follow-up with rheumatologist for his rheumatoid arthritis.      Review of Systems   Constitutional:  Negative for chills and fever.   HENT:  Negative for trouble swallowing.    Eyes:  Negative for visual disturbance.   Respiratory:  Negative for cough and shortness of breath.    Cardiovascular:  Negative for chest pain and palpitations.   Gastrointestinal:  Negative for abdominal pain, blood in stool and vomiting.   Endocrine: Negative for cold intolerance and heat intolerance.   Genitourinary:  Negative for difficulty urinating and dysuria.   Musculoskeletal:  Negative for gait problem.   Skin:  Negative for rash.   Neurological:  Negative for dizziness, syncope and headaches.   Hematological:  Negative for adenopathy.   Psychiatric/Behavioral:  Negative for behavioral problems.        Objective   /88 (BP Location: Left arm, Patient Position: Sitting, Cuff Size: Standard)   Pulse 79   Temp (!) 97.4 °F (36.3 °C) (Tympanic)   Resp 16   Ht 5' 9\" (1.753 m)   Wt 102 kg (224 lb 6.4 oz)   SpO2 99%   BMI 33.14 kg/m²      Physical Exam  Vitals and nursing note reviewed.   Constitutional:       Appearance: He is well-developed.   HENT:      Head: Normocephalic and atraumatic.   Eyes:      Pupils: Pupils are equal, round, and reactive to light.   Cardiovascular:      Rate and Rhythm: Normal rate and regular rhythm.      Heart sounds: Normal heart sounds.   Pulmonary:      Effort: Pulmonary effort is normal.      Breath sounds: Normal breath sounds.   Abdominal:      General: Bowel sounds are normal.      Palpations: Abdomen is soft.   Musculoskeletal:      Cervical back: Normal range of motion and neck supple.   Lymphadenopathy:      Cervical: No cervical adenopathy.   Skin:     General: Skin is warm.      Findings: No rash.   Neurological:      Mental Status: He is alert and oriented to person, place, and time.         "

## 2025-03-28 NOTE — ASSESSMENT & PLAN NOTE
Recent blood work showed elevated LDL levels. Discussed low-fat diet and exercise. Pt advised to continue with Crestor 5 mg as prescribed. Repeat blood work has been ordered for follow-up in 6 months.   Orders:  •  CBC and differential; Future  •  Comprehensive metabolic panel; Future  •  Lipid Panel with Direct LDL reflex; Future  •  TSH, 3rd generation with Free T4 reflex; Future

## 2025-03-28 NOTE — ASSESSMENT & PLAN NOTE
Well controlled. Pt advised to continue with losartain 100 mg and amlodipine 10 mg as prescribed. Will continue to monitor with follow up in 6 months.   Orders:  •  CBC and differential; Future  •  Comprehensive metabolic panel; Future  •  Lipid Panel with Direct LDL reflex; Future  •  TSH, 3rd generation with Free T4 reflex; Future

## 2025-03-28 NOTE — ASSESSMENT & PLAN NOTE
Improved.  Continue to follow low-carb diet and regular exercise.  Continue to monitor.  Orders:  •  Hemoglobin A1C; Future

## 2025-03-28 NOTE — ASSESSMENT & PLAN NOTE
Pt notes no change in symptoms. Pt advised to continue with current medications as prescribed.

## 2025-04-02 DIAGNOSIS — I10 ESSENTIAL HYPERTENSION: ICD-10-CM

## 2025-04-02 RX ORDER — AMLODIPINE BESYLATE 10 MG/1
10 TABLET ORAL DAILY
Qty: 90 TABLET | Refills: 1 | Status: SHIPPED | OUTPATIENT
Start: 2025-04-02

## 2025-04-17 DIAGNOSIS — I10 ESSENTIAL HYPERTENSION: ICD-10-CM

## 2025-04-17 RX ORDER — LOSARTAN POTASSIUM 100 MG/1
100 TABLET ORAL DAILY
Qty: 90 TABLET | Refills: 1 | Status: SHIPPED | OUTPATIENT
Start: 2025-04-17

## 2025-04-27 PROBLEM — Z12.5 PROSTATE CANCER SCREENING: Status: RESOLVED | Noted: 2023-02-02 | Resolved: 2025-04-27

## 2025-05-08 ENCOUNTER — TELEPHONE (OUTPATIENT)
Age: 60
End: 2025-05-08

## 2025-05-08 NOTE — TELEPHONE ENCOUNTER
Patient is scheduled to have dental implant placed this coming Monday by Dr. Posada on [facility] in [place]. Asking if he needs to hold rinvoq, if so how long before and how long after, please advise.

## 2025-05-09 NOTE — TELEPHONE ENCOUNTER
Advised and instructed pt as per Dr. Perez 05/09/25 notation. Pt stated he already stopped his medication. Patient had no further questions and/or concerns at this time.

## 2025-05-27 ENCOUNTER — OFFICE VISIT (OUTPATIENT)
Age: 60
End: 2025-05-27
Payer: COMMERCIAL

## 2025-05-27 ENCOUNTER — APPOINTMENT (OUTPATIENT)
Dept: LAB | Facility: IMAGING CENTER | Age: 60
End: 2025-05-27
Payer: COMMERCIAL

## 2025-05-27 VITALS
BODY MASS INDEX: 33.03 KG/M2 | TEMPERATURE: 97.6 F | DIASTOLIC BLOOD PRESSURE: 76 MMHG | HEIGHT: 69 IN | HEART RATE: 69 BPM | SYSTOLIC BLOOD PRESSURE: 124 MMHG | OXYGEN SATURATION: 97 % | WEIGHT: 223 LBS

## 2025-05-27 DIAGNOSIS — Z79.899 HIGH RISK MEDICATION USE: ICD-10-CM

## 2025-05-27 DIAGNOSIS — M05.9 SEROPOSITIVE RHEUMATOID ARTHRITIS (HCC): ICD-10-CM

## 2025-05-27 DIAGNOSIS — M05.9 SEROPOSITIVE RHEUMATOID ARTHRITIS (HCC): Primary | ICD-10-CM

## 2025-05-27 LAB
ALBUMIN SERPL BCG-MCNC: 4.1 G/DL (ref 3.5–5)
ALP SERPL-CCNC: 78 U/L (ref 34–104)
ALT SERPL W P-5'-P-CCNC: 39 U/L (ref 7–52)
ANION GAP SERPL CALCULATED.3IONS-SCNC: 9 MMOL/L (ref 4–13)
AST SERPL W P-5'-P-CCNC: 25 U/L (ref 13–39)
BASOPHILS # BLD AUTO: 0.04 THOUSANDS/ÂΜL (ref 0–0.1)
BASOPHILS NFR BLD AUTO: 1 % (ref 0–1)
BILIRUB SERPL-MCNC: 0.63 MG/DL (ref 0.2–1)
BUN SERPL-MCNC: 21 MG/DL (ref 5–25)
CALCIUM SERPL-MCNC: 9 MG/DL (ref 8.4–10.2)
CHLORIDE SERPL-SCNC: 106 MMOL/L (ref 96–108)
CO2 SERPL-SCNC: 23 MMOL/L (ref 21–32)
CREAT SERPL-MCNC: 0.98 MG/DL (ref 0.6–1.3)
EOSINOPHIL # BLD AUTO: 0.13 THOUSAND/ÂΜL (ref 0–0.61)
EOSINOPHIL NFR BLD AUTO: 2 % (ref 0–6)
ERYTHROCYTE [DISTWIDTH] IN BLOOD BY AUTOMATED COUNT: 12.9 % (ref 11.6–15.1)
GFR SERPL CREATININE-BSD FRML MDRD: 85 ML/MIN/1.73SQ M
GLUCOSE P FAST SERPL-MCNC: 132 MG/DL (ref 65–99)
HCT VFR BLD AUTO: 48 % (ref 36.5–49.3)
HGB BLD-MCNC: 15.6 G/DL (ref 12–17)
IMM GRANULOCYTES # BLD AUTO: 0.06 THOUSAND/UL (ref 0–0.2)
IMM GRANULOCYTES NFR BLD AUTO: 1 % (ref 0–2)
LYMPHOCYTES # BLD AUTO: 2.22 THOUSANDS/ÂΜL (ref 0.6–4.47)
LYMPHOCYTES NFR BLD AUTO: 26 % (ref 14–44)
MCH RBC QN AUTO: 29.3 PG (ref 26.8–34.3)
MCHC RBC AUTO-ENTMCNC: 32.5 G/DL (ref 31.4–37.4)
MCV RBC AUTO: 90 FL (ref 82–98)
MONOCYTES # BLD AUTO: 0.64 THOUSAND/ÂΜL (ref 0.17–1.22)
MONOCYTES NFR BLD AUTO: 8 % (ref 4–12)
NEUTROPHILS # BLD AUTO: 5.47 THOUSANDS/ÂΜL (ref 1.85–7.62)
NEUTS SEG NFR BLD AUTO: 62 % (ref 43–75)
NRBC BLD AUTO-RTO: 0 /100 WBCS
PLATELET # BLD AUTO: 294 THOUSANDS/UL (ref 149–390)
PMV BLD AUTO: 9.4 FL (ref 8.9–12.7)
POTASSIUM SERPL-SCNC: 3.8 MMOL/L (ref 3.5–5.3)
PROT SERPL-MCNC: 6.4 G/DL (ref 6.4–8.4)
RBC # BLD AUTO: 5.32 MILLION/UL (ref 3.88–5.62)
SODIUM SERPL-SCNC: 138 MMOL/L (ref 135–147)
WBC # BLD AUTO: 8.56 THOUSAND/UL (ref 4.31–10.16)

## 2025-05-27 PROCEDURE — 36415 COLL VENOUS BLD VENIPUNCTURE: CPT

## 2025-05-27 PROCEDURE — 80053 COMPREHEN METABOLIC PANEL: CPT

## 2025-05-27 PROCEDURE — 85025 COMPLETE CBC W/AUTO DIFF WBC: CPT

## 2025-05-27 PROCEDURE — 99214 OFFICE O/P EST MOD 30 MIN: CPT | Performed by: STUDENT IN AN ORGANIZED HEALTH CARE EDUCATION/TRAINING PROGRAM

## 2025-05-27 RX ORDER — AMOXICILLIN 500 MG/1
500 TABLET, FILM COATED ORAL 3 TIMES DAILY
COMMUNITY
Start: 2025-05-12

## 2025-05-27 RX ORDER — OXYCODONE AND ACETAMINOPHEN 5; 325 MG/1; MG/1
TABLET ORAL
COMMUNITY
Start: 2025-05-12

## 2025-05-27 NOTE — PROGRESS NOTES
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 At this time medication is working well for him and he does not have any complaints of flareups of his joint pains, very mild tenderness in 1 or 2 PIPs of the right hand today.  He is up-to-date on lipid screening this year.  Plan:  Continue Rinvoq 15 mg daily  Labs for monitoring   RTC 6 mo       High risk medication use             History of Present Illness   HPI  Chang Trent is a 59 y.o. male who presents for double seropositive RA.      Rheumatic disease summary  After Covid in  he started having joint pain in the hands bilaterally as well as fatigue, jaw pain etc.   Labs from  showed + CCP >340 and +RF 80   Pt used to see Dr. Campbell at [facility]  He had been on and off steroids over a few years for his arthralgias.  He had tried MTX in the past but could not tolerate GI side effects  Denied any hx of psoriasis, dactylitis, etc.   He had tried Enbrel in the past and it was not very helpful.   Currently taking Rinvoq 15 mg daily     Last labs from March showed positive CCP above 340, negative rheumatoid factor.  Normal uric acid level, CBC, CMP and inflammatory markers.    X-ray of the bilateral hands were negative for any erosions or significant degenerative changes.  X-ray of bilateral feet showed calcaneal enthesophytes, without any other erosive changes.    Had a recent dental implant for which he had temporarily paused the medication for 2 weeks.     At this time, no swollen joint or prolonged stiffness in his hands.               Review of Systems   Constitutional: Negative.    HENT: Negative.     Eyes: Negative.   "  Respiratory: Negative.     Cardiovascular: Negative.    Gastrointestinal: Negative.    Genitourinary: Negative.    Musculoskeletal:  Positive for arthralgias.          Objective   /76 (BP Location: Left arm, Patient Position: Sitting, Cuff Size: Standard)   Pulse 69   Temp 97.6 °F (36.4 °C) (Tympanic)   Ht 5' 9\" (1.753 m)   Wt 101 kg (223 lb)   SpO2 97%   BMI 32.93 kg/m²      Physical Exam  Constitutional:       Appearance: Normal appearance.   HENT:      Head: Normocephalic.     Eyes:      Extraocular Movements: Extraocular movements intact.      Pupils: Pupils are equal, round, and reactive to light.       Cardiovascular:      Pulses: Normal pulses.      Heart sounds: Normal heart sounds.   Pulmonary:      Effort: Pulmonary effort is normal.      Breath sounds: Normal breath sounds.     Musculoskeletal:      Comments: MSK Exam  The following areas have been examined today and do not show any signs of synovitis, tenderness, or restricted ROM unless otherwise specified below:  Neck  Shoulders  Back  Elbows  Wrists  Hands-tenderness in the right hand fifth MCP and PIP joints  Hips  Knees  Ankles  Feet      Neurological:      Mental Status: He is alert and oriented to person, place, and time.           "

## 2025-06-24 DIAGNOSIS — K22.70 BARRETT'S ESOPHAGUS WITHOUT DYSPLASIA: ICD-10-CM

## 2025-06-25 RX ORDER — ESOMEPRAZOLE MAGNESIUM 40 MG/1
40 CAPSULE, DELAYED RELEASE ORAL
Qty: 90 CAPSULE | Refills: 1 | Status: SHIPPED | OUTPATIENT
Start: 2025-06-25

## 2025-06-26 DIAGNOSIS — M25.50 ARTHRALGIA, UNSPECIFIED JOINT: ICD-10-CM

## 2025-06-26 RX ORDER — UPADACITINIB 15 MG/1
1 TABLET, EXTENDED RELEASE ORAL DAILY
Qty: 30 TABLET | Refills: 4 | Status: SHIPPED | OUTPATIENT
Start: 2025-06-26

## 2025-07-07 ENCOUNTER — OFFICE VISIT (OUTPATIENT)
Dept: FAMILY MEDICINE CLINIC | Facility: CLINIC | Age: 60
End: 2025-07-07
Payer: COMMERCIAL

## 2025-07-07 VITALS
TEMPERATURE: 98.6 F | OXYGEN SATURATION: 98 % | WEIGHT: 227 LBS | DIASTOLIC BLOOD PRESSURE: 84 MMHG | SYSTOLIC BLOOD PRESSURE: 138 MMHG | HEIGHT: 69 IN | RESPIRATION RATE: 16 BRPM | HEART RATE: 79 BPM | BODY MASS INDEX: 33.62 KG/M2

## 2025-07-07 DIAGNOSIS — R10.31 RIGHT GROIN PAIN: Primary | ICD-10-CM

## 2025-07-07 PROCEDURE — 99213 OFFICE O/P EST LOW 20 MIN: CPT | Performed by: FAMILY MEDICINE

## 2025-07-07 NOTE — PROGRESS NOTES
"Name: Chang Trent      : 1965      MRN: [id number]  Encounter Provider: Isabela Rain MD  Encounter Date: 2025   Encounter department: [facility]  :  Assessment & Plan  Right groin pain  Was told most likely secondary to muscle strain.  I am going to check ultrasound for his right groin area.  Was told to take Aleve twice a day for 7 to 10 days.  Discussed with possible side effect.  If not improving call or come back.  Orders:  •  US groin/inguinal area; Future           History of Present Illness   Is here today with complaint of pain in the right groin area started few days ago worse.  He denies any recent history of injury or trauma.  Denies any urinary symptoms.  Denies any blood in his urine.    Groin Pain  Pertinent negatives include no abdominal pain, chest pain, chills, coughing, dysuria, fever, headaches, rash, shortness of breath or vomiting.     Review of Systems   Constitutional:  Negative for chills and fever.   HENT:  Negative for trouble swallowing.    Eyes:  Negative for visual disturbance.   Respiratory:  Negative for cough and shortness of breath.    Cardiovascular:  Negative for chest pain and palpitations.   Gastrointestinal:  Negative for abdominal pain, blood in stool and vomiting.        Right groin pain   Endocrine: Negative for cold intolerance and heat intolerance.   Genitourinary:  Negative for difficulty urinating and dysuria.   Musculoskeletal:  Negative for gait problem.   Skin:  Negative for rash.   Neurological:  Negative for dizziness, syncope and headaches.   Hematological:  Negative for adenopathy.   Psychiatric/Behavioral:  Negative for behavioral problems.        Objective   /84 (BP Location: Left arm, Patient Position: Sitting, Cuff Size: Large)   Pulse 79   Temp 98.6 °F (37 °C) (Tympanic)   Resp 16   Ht 5' 9\" (1.753 m)   Wt 103 kg (227 lb)   SpO2 98%   BMI 33.52 kg/m²      Physical Exam  Vitals and nursing note " reviewed.   Constitutional:       Appearance: He is well-developed.   HENT:      Head: Normocephalic and atraumatic.     Eyes:      Pupils: Pupils are equal, round, and reactive to light.       Cardiovascular:      Rate and Rhythm: Normal rate and regular rhythm.      Heart sounds: Normal heart sounds.   Pulmonary:      Effort: Pulmonary effort is normal.      Breath sounds: Normal breath sounds.   Abdominal:      General: Bowel sounds are normal.      Palpations: Abdomen is soft.     Musculoskeletal:         General: Tenderness present.      Cervical back: Normal range of motion and neck supple.        Legs:       Comments: Tenderness over the right groin area.   Lymphadenopathy:      Cervical: No cervical adenopathy.     Skin:     General: Skin is warm.      Findings: No rash.     Neurological:      Mental Status: He is alert and oriented to person, place, and time.

## 2025-07-07 NOTE — ASSESSMENT & PLAN NOTE
Was told most likely secondary to muscle strain.  I am going to check ultrasound for his right groin area.  Was told to take Aleve twice a day for 7 to 10 days.  Discussed with possible side effect.  If not improving call or come back.  Orders:  •  US groin/inguinal area; Future

## 2025-07-09 DIAGNOSIS — E78.49 OTHER HYPERLIPIDEMIA: ICD-10-CM

## 2025-07-10 RX ORDER — ROSUVASTATIN CALCIUM 5 MG/1
5 TABLET, COATED ORAL DAILY
Qty: 90 TABLET | Refills: 1 | Status: SHIPPED | OUTPATIENT
Start: 2025-07-10

## 2025-07-28 ENCOUNTER — TELEPHONE (OUTPATIENT)
Dept: RHEUMATOLOGY | Facility: CLINIC | Age: 60
End: 2025-07-28